# Patient Record
Sex: FEMALE | Race: BLACK OR AFRICAN AMERICAN | Employment: UNEMPLOYED | URBAN - METROPOLITAN AREA
[De-identification: names, ages, dates, MRNs, and addresses within clinical notes are randomized per-mention and may not be internally consistent; named-entity substitution may affect disease eponyms.]

---

## 2018-04-28 ENCOUNTER — HOSPITAL ENCOUNTER (EMERGENCY)
Age: 38
Discharge: HOME | End: 2018-04-28
Attending: EMERGENCY MEDICINE

## 2018-04-28 VITALS
SYSTOLIC BLOOD PRESSURE: 142 MMHG | WEIGHT: 173.06 LBS | TEMPERATURE: 98.9 F | DIASTOLIC BLOOD PRESSURE: 90 MMHG | BODY MASS INDEX: 27.81 KG/M2 | HEART RATE: 100 BPM | HEIGHT: 66 IN | OXYGEN SATURATION: 100 % | RESPIRATION RATE: 16 BRPM

## 2018-04-28 DIAGNOSIS — N89.8 VAGINAL DISCHARGE: Primary | ICD-10-CM

## 2018-04-28 DIAGNOSIS — E86.0 DEHYDRATION: ICD-10-CM

## 2018-04-28 LAB
-: ABNORMAL
AMORPHOUS: ABNORMAL
BACTERIA: ABNORMAL
BILIRUBIN URINE: NEGATIVE
CASTS UA: ABNORMAL /LPF
CHP ED QC CHECK: NORMAL
COLOR: YELLOW
COMMENT UA: ABNORMAL
CRYSTALS, UA: ABNORMAL /HPF
DIRECT EXAM: NORMAL
EPITHELIAL CELLS UA: ABNORMAL /HPF (ref 0–5)
GLUCOSE URINE: NEGATIVE
KETONES, URINE: NEGATIVE
LEUKOCYTE ESTERASE, URINE: NEGATIVE
Lab: NORMAL
MUCUS: ABNORMAL
NITRITE, URINE: NEGATIVE
OTHER OBSERVATIONS UA: ABNORMAL
PH UA: 6.5 (ref 5–8)
PREGNANCY TEST URINE, POC: NORMAL
PROTEIN UA: NEGATIVE
RBC UA: ABNORMAL /HPF (ref 0–2)
RENAL EPITHELIAL, UA: ABNORMAL /HPF
SPECIFIC GRAVITY UA: 1.02 (ref 1–1.03)
SPECIMEN DESCRIPTION: NORMAL
STATUS: NORMAL
TRICHOMONAS: ABNORMAL
TURBIDITY: ABNORMAL
URINE HGB: NEGATIVE
UROBILINOGEN, URINE: ABNORMAL
WBC UA: ABNORMAL /HPF (ref 0–5)
YEAST: ABNORMAL

## 2018-04-28 PROCEDURE — 87086 URINE CULTURE/COLONY COUNT: CPT

## 2018-04-28 PROCEDURE — 2580000003 HC RX 258: Performed by: NURSE PRACTITIONER

## 2018-04-28 PROCEDURE — 87660 TRICHOMONAS VAGIN DIR PROBE: CPT

## 2018-04-28 PROCEDURE — 84703 CHORIONIC GONADOTROPIN ASSAY: CPT

## 2018-04-28 PROCEDURE — 81001 URINALYSIS AUTO W/SCOPE: CPT

## 2018-04-28 PROCEDURE — 96360 HYDRATION IV INFUSION INIT: CPT

## 2018-04-28 PROCEDURE — 87480 CANDIDA DNA DIR PROBE: CPT

## 2018-04-28 PROCEDURE — 99283 EMERGENCY DEPT VISIT LOW MDM: CPT

## 2018-04-28 PROCEDURE — 87510 GARDNER VAG DNA DIR PROBE: CPT

## 2018-04-28 PROCEDURE — 87491 CHLMYD TRACH DNA AMP PROBE: CPT

## 2018-04-28 PROCEDURE — 87591 N.GONORRHOEAE DNA AMP PROB: CPT

## 2018-04-28 RX ORDER — DEXTROAMPHETAMINE SACCHARATE, AMPHETAMINE ASPARTATE, DEXTROAMPHETAMINE SULFATE AND AMPHETAMINE SULFATE 5; 5; 5; 5 MG/1; MG/1; MG/1; MG/1
20 TABLET ORAL DAILY
COMMUNITY

## 2018-04-28 RX ORDER — 0.9 % SODIUM CHLORIDE 0.9 %
1000 INTRAVENOUS SOLUTION INTRAVENOUS ONCE
Status: COMPLETED | OUTPATIENT
Start: 2018-04-28 | End: 2018-04-28

## 2018-04-28 RX ADMIN — SODIUM CHLORIDE 1000 ML: 9 INJECTION, SOLUTION INTRAVENOUS at 13:27

## 2018-04-29 LAB
CULTURE: NORMAL
CULTURE: NORMAL
Lab: NORMAL
SPECIMEN DESCRIPTION: NORMAL
SPECIMEN DESCRIPTION: NORMAL
STATUS: NORMAL

## 2018-04-30 LAB
C TRACH DNA GENITAL QL NAA+PROBE: NEGATIVE
HCG, PREGNANCY URINE (POC): NEGATIVE
N. GONORRHOEAE DNA: NEGATIVE

## 2020-03-02 ENCOUNTER — APPOINTMENT (OUTPATIENT)
Dept: GENERAL RADIOLOGY | Age: 40
End: 2020-03-02

## 2020-03-02 ENCOUNTER — HOSPITAL ENCOUNTER (EMERGENCY)
Age: 40
Discharge: HOME | End: 2020-03-02
Attending: EMERGENCY MEDICINE | Admitting: SURGERY

## 2020-03-02 VITALS
HEIGHT: 66 IN | DIASTOLIC BLOOD PRESSURE: 84 MMHG | TEMPERATURE: 98.7 F | OXYGEN SATURATION: 100 % | WEIGHT: 169.1 LBS | BODY MASS INDEX: 27.18 KG/M2 | RESPIRATION RATE: 16 BRPM | SYSTOLIC BLOOD PRESSURE: 142 MMHG | HEART RATE: 90 BPM

## 2020-03-02 LAB — D-DIMER QUANTITATIVE: 1.48 MG/L FEU

## 2020-03-02 PROCEDURE — 73090 X-RAY EXAM OF FOREARM: CPT

## 2020-03-02 PROCEDURE — 99283 EMERGENCY DEPT VISIT LOW MDM: CPT

## 2020-03-02 PROCEDURE — 85379 FIBRIN DEGRADATION QUANT: CPT

## 2020-03-02 ASSESSMENT — ENCOUNTER SYMPTOMS
CHEST TIGHTNESS: 0
VOMITING: 0
COLOR CHANGE: 1
NAUSEA: 0
SHORTNESS OF BREATH: 0

## 2020-03-02 ASSESSMENT — PAIN DESCRIPTION - LOCATION: LOCATION: ARM

## 2020-03-02 ASSESSMENT — PAIN DESCRIPTION - ORIENTATION: ORIENTATION: LEFT

## 2020-03-02 ASSESSMENT — PAIN SCALES - GENERAL: PAINLEVEL_OUTOF10: 6

## 2020-03-02 NOTE — ED PROVIDER NOTES
EMERGENCY DEPARTMENT ENCOUNTER   ATTENDING ATTESTATION     Pt Name: Elena Anderson  MRN: [id number]  [place] 1980  Date of evaluation: 3/2/20   Elena Anderson is a 46 y.o. female with CC: Arm Pain    MDM:   The patient is a 72-year-old female who presented to the emergency department secondary to left arm pain. In February 20 after she was seen in outside hospital IV was placed difficult IV access. She was concern of foreign body. X-rays obtained concern for 4.4 mm radiopaque density. I did place a bedside ultrasound which also was consistent likely foreign body. Vascular service paged. Discussed with Dr. Neal Officer, who will see the patient in the office. Given outpatient follow-up and parameters to return to the emergency department. CRITICAL CARE:       EKG: All EKG's are interpreted by the Emergency Department Physician who either signs or Co-signs this chart in the absence of a cardiologist.      RADIOLOGY:All plain film, CT, MRI, and formal ultrasound images (except ED bedside ultrasound) are read by the radiologist, see reports below, unless otherwise noted in MDM or here. XR RADIUS ULNA LEFT (2 VIEWS)   Final Result   No acute osseous abnormality. 4 mm radiodense foreign body 17 mm below the volar aspect of the skin mid   forearm. LABS: All lab results were reviewed by myself, and all abnormals are listed below. Labs Reviewed   D-DIMER, QUANTITATIVE     CONSULTS:  None  FINAL IMPRESSION      1. Retained foreign body of upper extremity            PASTMEDICAL HISTORY     Past Medical History:   Diagnosis Date    ADHD      SURGICAL HISTORY     History reviewed. No pertinent surgical history. CURRENT MEDICATIONS       Previous Medications    AMPHETAMINE-DEXTROAMPHETAMINE (ADDERALL) 20 MG TABLET    Take 20 mg by mouth daily. .     ALLERGIES     has No Known Allergies. FAMILY HISTORY     has no family status information on file.       SOCIAL HISTORY       Social History
Substance Use Topics    Alcohol use: No    Drug use: No     PHYSICAL EXAM     INITIAL VITALS: BP (!) 142/84   Pulse 90   Temp 98.7 °F (37.1 °C) (Oral)   Resp 16   Ht 5' 6\" (1.676 m)   Wt 169 lb 1.6 oz (76.7 kg)   LMP 02/17/2020   SpO2 100%   BMI 27.29 kg/m²    Physical Exam  Constitutional:       Appearance: Normal appearance. She is not ill-appearing. HENT:      Right Ear: External ear normal.      Left Ear: External ear normal.      Mouth/Throat:      Mouth: Mucous membranes are moist.      Pharynx: Oropharynx is clear. Eyes:      General:         Right eye: No discharge. Left eye: No discharge. Conjunctiva/sclera: Conjunctivae normal.   Cardiovascular:      Rate and Rhythm: Normal rate and regular rhythm. Pulses: Normal pulses. Pulmonary:      Effort: Pulmonary effort is normal.      Breath sounds: Normal breath sounds. Musculoskeletal: Normal range of motion. General: Tenderness present. No swelling or deformity. Arms:    Skin:     General: Skin is warm and dry. Findings: No bruising or erythema. Neurological:      General: No focal deficit present. Mental Status: She is alert and oriented to person, place, and time. Sensory: No sensory deficit. Psychiatric:         Mood and Affect: Mood normal.         Thought Content: Thought content normal.         DIAGNOSTIC RESULTS     RADIOLOGY:All plain film, CT, MRI, and formal ultrasound images (except ED bedside ultrasound) are read by the radiologist, see reports below, unless otherwise noted in MDM or here. Interpretation per the Radiologist below, if available at the time of this note:    XR RADIUS ULNA LEFT (2 VIEWS)   Final Result   No acute osseous abnormality. 4 mm radiodense foreign body 17 mm below the volar aspect of the skin mid   forearm.              LABS:  Labs Reviewed   D-DIMER, QUANTITATIVE       All other labs were within normal range or not returned as of this

## 2020-03-03 ENCOUNTER — HOSPITAL ENCOUNTER (OUTPATIENT)
Age: 40
Discharge: HOME | End: 2020-03-05

## 2020-03-03 PROCEDURE — 93971 EXTREMITY STUDY: CPT

## 2020-03-05 ENCOUNTER — ANESTHESIA EVENT (OUTPATIENT)
Dept: OPERATING ROOM | Age: 40
End: 2020-03-05

## 2020-03-06 ENCOUNTER — HOSPITAL ENCOUNTER (OUTPATIENT)
Age: 40
Setting detail: OUTPATIENT SURGERY
Discharge: HOME | End: 2020-03-06
Attending: SURGERY | Admitting: SURGERY

## 2020-03-06 ENCOUNTER — ANESTHESIA (OUTPATIENT)
Dept: OPERATING ROOM | Age: 40
End: 2020-03-06

## 2020-03-06 VITALS
HEIGHT: 66 IN | WEIGHT: 167 LBS | SYSTOLIC BLOOD PRESSURE: 134 MMHG | BODY MASS INDEX: 26.84 KG/M2 | RESPIRATION RATE: 17 BRPM | DIASTOLIC BLOOD PRESSURE: 81 MMHG | HEART RATE: 95 BPM | TEMPERATURE: 98.4 F | OXYGEN SATURATION: 100 %

## 2020-03-06 VITALS — OXYGEN SATURATION: 100 % | SYSTOLIC BLOOD PRESSURE: 124 MMHG | DIASTOLIC BLOOD PRESSURE: 65 MMHG

## 2020-03-06 LAB — HCG, PREGNANCY URINE (POC): NEGATIVE

## 2020-03-06 PROCEDURE — 6360000002 HC RX W HCPCS: Performed by: NURSE ANESTHETIST, CERTIFIED REGISTERED

## 2020-03-06 PROCEDURE — 81025 URINE PREGNANCY TEST: CPT

## 2020-03-06 PROCEDURE — 3700000000 HC ANESTHESIA ATTENDED CARE: Performed by: SURGERY

## 2020-03-06 PROCEDURE — 2500000003 HC RX 250 WO HCPCS: Performed by: SURGERY

## 2020-03-06 PROCEDURE — 3700000001 HC ADD 15 MINUTES (ANESTHESIA): Performed by: SURGERY

## 2020-03-06 PROCEDURE — 88311 DECALCIFY TISSUE: CPT

## 2020-03-06 PROCEDURE — 88305 TISSUE EXAM BY PATHOLOGIST: CPT

## 2020-03-06 PROCEDURE — 3600000002 HC SURGERY LEVEL 2 BASE: Performed by: SURGERY

## 2020-03-06 PROCEDURE — 2500000003 HC RX 250 WO HCPCS: Performed by: NURSE ANESTHETIST, CERTIFIED REGISTERED

## 2020-03-06 PROCEDURE — 7100000010 HC PHASE II RECOVERY - FIRST 15 MIN: Performed by: SURGERY

## 2020-03-06 PROCEDURE — 7100000011 HC PHASE II RECOVERY - ADDTL 15 MIN: Performed by: SURGERY

## 2020-03-06 PROCEDURE — 2500000003 HC RX 250 WO HCPCS

## 2020-03-06 PROCEDURE — 2580000003 HC RX 258: Performed by: ANESTHESIOLOGY

## 2020-03-06 PROCEDURE — 2709999900 HC NON-CHARGEABLE SUPPLY: Performed by: SURGERY

## 2020-03-06 PROCEDURE — 3600000012 HC SURGERY LEVEL 2 ADDTL 15MIN: Performed by: SURGERY

## 2020-03-06 RX ORDER — PROPOFOL 10 MG/ML
INJECTION, EMULSION INTRAVENOUS PRN
Status: DISCONTINUED | OUTPATIENT
Start: 2020-03-06 | End: 2020-03-06 | Stop reason: SDUPTHER

## 2020-03-06 RX ORDER — PROPOFOL 10 MG/ML
INJECTION, EMULSION INTRAVENOUS CONTINUOUS PRN
Status: DISCONTINUED | OUTPATIENT
Start: 2020-03-06 | End: 2020-03-06 | Stop reason: SDUPTHER

## 2020-03-06 RX ORDER — LIDOCAINE HYDROCHLORIDE 10 MG/ML
INJECTION, SOLUTION EPIDURAL; INFILTRATION; INTRACAUDAL; PERINEURAL PRN
Status: DISCONTINUED | OUTPATIENT
Start: 2020-03-06 | End: 2020-03-06 | Stop reason: ALTCHOICE

## 2020-03-06 RX ORDER — HYDROMORPHONE HCL 110MG/55ML
0.5 PATIENT CONTROLLED ANALGESIA SYRINGE INTRAVENOUS EVERY 5 MIN PRN
Status: DISCONTINUED | OUTPATIENT
Start: 2020-03-06 | End: 2020-03-06 | Stop reason: HOSPADM

## 2020-03-06 RX ORDER — LIDOCAINE HYDROCHLORIDE 10 MG/ML
1 INJECTION, SOLUTION EPIDURAL; INFILTRATION; INTRACAUDAL; PERINEURAL
Status: DISCONTINUED | OUTPATIENT
Start: 2020-03-06 | End: 2020-03-06 | Stop reason: HOSPADM

## 2020-03-06 RX ORDER — LABETALOL HYDROCHLORIDE 5 MG/ML
5 INJECTION, SOLUTION INTRAVENOUS EVERY 10 MIN PRN
Status: DISCONTINUED | OUTPATIENT
Start: 2020-03-06 | End: 2020-03-06 | Stop reason: HOSPADM

## 2020-03-06 RX ORDER — OXYCODONE HYDROCHLORIDE AND ACETAMINOPHEN 5; 325 MG/1; MG/1
2 TABLET ORAL PRN
Status: DISCONTINUED | OUTPATIENT
Start: 2020-03-06 | End: 2020-03-06 | Stop reason: HOSPADM

## 2020-03-06 RX ORDER — PROMETHAZINE HYDROCHLORIDE 25 MG/ML
6.25 INJECTION, SOLUTION INTRAMUSCULAR; INTRAVENOUS
Status: DISCONTINUED | OUTPATIENT
Start: 2020-03-06 | End: 2020-03-06 | Stop reason: HOSPADM

## 2020-03-06 RX ORDER — SODIUM CHLORIDE, SODIUM LACTATE, POTASSIUM CHLORIDE, CALCIUM CHLORIDE 600; 310; 30; 20 MG/100ML; MG/100ML; MG/100ML; MG/100ML
INJECTION, SOLUTION INTRAVENOUS CONTINUOUS
Status: DISCONTINUED | OUTPATIENT
Start: 2020-03-06 | End: 2020-03-06 | Stop reason: HOSPADM

## 2020-03-06 RX ORDER — SODIUM CHLORIDE 0.9 % (FLUSH) 0.9 %
10 SYRINGE (ML) INJECTION PRN
Status: DISCONTINUED | OUTPATIENT
Start: 2020-03-06 | End: 2020-03-06 | Stop reason: HOSPADM

## 2020-03-06 RX ORDER — FENTANYL CITRATE 50 UG/ML
25 INJECTION, SOLUTION INTRAMUSCULAR; INTRAVENOUS EVERY 5 MIN PRN
Status: DISCONTINUED | OUTPATIENT
Start: 2020-03-06 | End: 2020-03-06 | Stop reason: HOSPADM

## 2020-03-06 RX ORDER — MIDAZOLAM HYDROCHLORIDE 1 MG/ML
INJECTION INTRAMUSCULAR; INTRAVENOUS PRN
Status: DISCONTINUED | OUTPATIENT
Start: 2020-03-06 | End: 2020-03-06 | Stop reason: SDUPTHER

## 2020-03-06 RX ORDER — HYDRALAZINE HYDROCHLORIDE 20 MG/ML
5 INJECTION INTRAMUSCULAR; INTRAVENOUS EVERY 10 MIN PRN
Status: DISCONTINUED | OUTPATIENT
Start: 2020-03-06 | End: 2020-03-06 | Stop reason: HOSPADM

## 2020-03-06 RX ORDER — LIDOCAINE HYDROCHLORIDE 20 MG/ML
INJECTION, SOLUTION EPIDURAL; INFILTRATION; INTRACAUDAL; PERINEURAL PRN
Status: DISCONTINUED | OUTPATIENT
Start: 2020-03-06 | End: 2020-03-06 | Stop reason: SDUPTHER

## 2020-03-06 RX ORDER — SODIUM CHLORIDE 9 MG/ML
INJECTION, SOLUTION INTRAVENOUS CONTINUOUS
Status: DISCONTINUED | OUTPATIENT
Start: 2020-03-06 | End: 2020-03-06

## 2020-03-06 RX ORDER — OXYCODONE HYDROCHLORIDE AND ACETAMINOPHEN 5; 325 MG/1; MG/1
1 TABLET ORAL PRN
Status: DISCONTINUED | OUTPATIENT
Start: 2020-03-06 | End: 2020-03-06 | Stop reason: HOSPADM

## 2020-03-06 RX ORDER — ONDANSETRON 2 MG/ML
4 INJECTION INTRAMUSCULAR; INTRAVENOUS
Status: DISCONTINUED | OUTPATIENT
Start: 2020-03-06 | End: 2020-03-06 | Stop reason: HOSPADM

## 2020-03-06 RX ORDER — SODIUM CHLORIDE 0.9 % (FLUSH) 0.9 %
10 SYRINGE (ML) INJECTION EVERY 12 HOURS SCHEDULED
Status: DISCONTINUED | OUTPATIENT
Start: 2020-03-06 | End: 2020-03-06 | Stop reason: HOSPADM

## 2020-03-06 RX ADMIN — SODIUM CHLORIDE, POTASSIUM CHLORIDE, SODIUM LACTATE AND CALCIUM CHLORIDE: 600; 310; 30; 20 INJECTION, SOLUTION INTRAVENOUS at 11:02

## 2020-03-06 RX ADMIN — PROPOFOL 50 MG: 10 INJECTION, EMULSION INTRAVENOUS at 11:49

## 2020-03-06 RX ADMIN — LIDOCAINE HYDROCHLORIDE 80 MG: 20 INJECTION, SOLUTION EPIDURAL; INFILTRATION; INTRACAUDAL; PERINEURAL at 11:46

## 2020-03-06 RX ADMIN — PROPOFOL 50 MG: 10 INJECTION, EMULSION INTRAVENOUS at 11:46

## 2020-03-06 RX ADMIN — PROPOFOL 50 MG: 10 INJECTION, EMULSION INTRAVENOUS at 11:57

## 2020-03-06 RX ADMIN — MIDAZOLAM 2 MG: 1 INJECTION INTRAMUSCULAR; INTRAVENOUS at 11:38

## 2020-03-06 RX ADMIN — PROPOFOL 150 MCG/KG/MIN: 10 INJECTION, EMULSION INTRAVENOUS at 11:46

## 2020-03-06 RX ADMIN — PROPOFOL 30 MG: 10 INJECTION, EMULSION INTRAVENOUS at 12:18

## 2020-03-06 ASSESSMENT — PULMONARY FUNCTION TESTS
PIF_VALUE: 1
PIF_VALUE: 1
PIF_VALUE: 2
PIF_VALUE: 1
PIF_VALUE: 0
PIF_VALUE: 1
PIF_VALUE: 0
PIF_VALUE: 1
PIF_VALUE: 0
PIF_VALUE: 1

## 2020-03-06 ASSESSMENT — PAIN - FUNCTIONAL ASSESSMENT: PAIN_FUNCTIONAL_ASSESSMENT: 0-10
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
## 2020-03-06 NOTE — ANESTHESIA PRE PROCEDURE
Department of Anesthesiology  Preprocedure Note       Name:  Dina Vale   Age:  46 y.o.  :  1980                                          MRN:  [id number]         Date:  3/6/2020      Surgeon: Mejia Lassiter):  Lali Abad MD    Procedure: LEFT   ARM FOREIGN BODY REMOVAL  (IV CATHETER) (Left )    Medications prior to admission:   Prior to Admission medications    Medication Sig Start Date End Date Taking? Authorizing Provider   amphetamine-dextroamphetamine (ADDERALL) 20 MG tablet Take 20 mg by mouth daily. Gilbert George Historical Provider, MD       Current medications:    Current Facility-Administered Medications   Medication Dose Route Frequency Provider Last Rate Last Dose    lactated ringers infusion   Intravenous Continuous Kareen Shirley  mL/hr at 20 1102      sodium chloride flush 0.9 % injection 10 mL  10 mL Intravenous 2 times per day Kareen Shirley MD        sodium chloride flush 0.9 % injection 10 mL  10 mL Intravenous PRN Emy Oconnor MD        lidocaine PF 1 % injection 1 mL  1 mL Intradermal Once PRN Kareen Shirley MD        lidocaine PF 1 % injection    PRN Lali Abad MD   10 mL at 20 1213     Facility-Administered Medications Ordered in Other Encounters   Medication Dose Route Frequency Provider Last Rate Last Dose    propofol injection    PRN Mayda Rendon, APRN - CRNA   30 mg at 20 1218    propofol injection    Continuous PRN Mayda Rendon, APRN - CRNA 68.2 mL/hr at 20 1214 150 mcg/kg/min at 20 1214    lidocaine PF 2 % injection    PRN Mayda Rendon, APRN - CRNA   80 mg at 20 1146    midazolam (VERSED) injection    PRN Mayda Rendon, APRN - CRNA   2 mg at 20 1138       Allergies:  No Known Allergies    Problem List:  There is no problem list on file for this patient.       Past Medical History:        Diagnosis Date    ADHD     History of blood transfusion 2020       Past Surgical History: Procedure Laterality Date    ABDOMINAL ADHESION SURGERY  2013    removal of adhesions from bowel    GASTRIC BYPASS SURGERY  2013    OTHER SURGICAL HISTORY  2013    bowel obstruction surgery       Social History:    Social History     Tobacco Use    Smoking status: Current Every Day Smoker     Packs/day: 0.50   Substance Use Topics    Alcohol use: Yes     Alcohol/week: 8.0 standard drinks     Types: 8 Glasses of wine per week                                Ready to quit: Not Answered  Counseling given: Not Answered      Vital Signs (Current):   Vitals:    03/06/20 1025 03/06/20 1031   BP: 137/86    Pulse: 107    Resp: 18    Temp: 98.8 °F (37.1 °C)    TempSrc: Oral    SpO2: 100%    Weight:  167 lb (75.8 kg)   Height:  5' 6\" (1.676 m)                                              BP Readings from Last 3 Encounters:   03/06/20 137/86   03/02/20 (!) 142/84   04/28/18 (!) 142/90       NPO Status: Time of last liquid consumption: 1930                        Time of last solid consumption: 1930                        Date of last liquid consumption: 03/05/20                        Date of last solid food consumption: 03/05/20    BMI:   Wt Readings from Last 3 Encounters:   03/06/20 167 lb (75.8 kg)   03/02/20 169 lb 1.6 oz (76.7 kg)   04/28/18 173 lb 1 oz (78.5 kg)     Body mass index is 26.95 kg/m². CBC: No results found for: WBC, RBC, HGB, HCT, MCV, RDW, PLT    CMP: No results found for: NA, K, CL, CO2, BUN, CREATININE, GFRAA, AGRATIO, LABGLOM, GLUCOSE, PROT, CALCIUM, BILITOT, ALKPHOS, AST, ALT    POC Tests: No results for input(s): POCGLU, POCNA, POCK, POCCL, POCBUN, POCHEMO, POCHCT in the last 72 hours.     Coags: No results found for: PROTIME, INR, APTT    HCG (If Applicable):   Lab Results   Component Value Date    PREGTESTUR neg 04/28/2018    HCG NEGATIVE 03/06/2020        ABGs: No results found for: PHART, PO2ART, UPO1LAI, CQA5RPY, BEART, E3FOHTWF     Type & Screen (If Applicable):  No results found for:

## 2020-03-09 LAB — SURGICAL PATHOLOGY REPORT: NORMAL

## 2021-07-08 RX ORDER — SODIUM CHLORIDE, SODIUM LACTATE, POTASSIUM CHLORIDE, CALCIUM CHLORIDE 600; 310; 30; 20 MG/100ML; MG/100ML; MG/100ML; MG/100ML
1000 INJECTION, SOLUTION INTRAVENOUS CONTINUOUS
Status: CANCELLED | OUTPATIENT
Start: 2021-07-08

## 2021-07-13 ENCOUNTER — HOSPITAL ENCOUNTER (OUTPATIENT)
Dept: PREADMISSION TESTING | Age: 41
Setting detail: OUTPATIENT SURGERY
Discharge: HOME | End: 2021-07-17
Payer: MEDICAID

## 2021-07-13 VITALS
WEIGHT: 155 LBS | SYSTOLIC BLOOD PRESSURE: 109 MMHG | TEMPERATURE: 97.2 F | OXYGEN SATURATION: 100 % | RESPIRATION RATE: 20 BRPM | HEART RATE: 104 BPM | BODY MASS INDEX: 24.91 KG/M2 | HEIGHT: 66 IN | DIASTOLIC BLOOD PRESSURE: 72 MMHG

## 2021-07-13 LAB
HCT VFR BLD CALC: 28.7 % (ref 36.3–47.1)
HEMOGLOBIN: 8 G/DL (ref 11.9–15.1)
MCH RBC QN AUTO: 24.2 PG (ref 25.2–33.5)
MCHC RBC AUTO-ENTMCNC: 27.9 G/DL (ref 28.4–34.8)
MCV RBC AUTO: 86.7 FL (ref 82.6–102.9)
NRBC AUTOMATED: 0 PER 100 WBC
PDW BLD-RTO: 18.7 % (ref 11.8–14.4)
PLATELET # BLD: 566 K/UL (ref 138–453)
PMV BLD AUTO: 9.2 FL (ref 8.1–13.5)
RBC # BLD: 3.31 M/UL (ref 3.95–5.11)
WBC # BLD: 5.3 K/UL (ref 3.5–11.3)

## 2021-07-13 PROCEDURE — 85027 COMPLETE CBC AUTOMATED: CPT

## 2021-07-13 PROCEDURE — 93005 ELECTROCARDIOGRAM TRACING: CPT | Performed by: ANESTHESIOLOGY

## 2021-07-13 PROCEDURE — 36415 COLL VENOUS BLD VENIPUNCTURE: CPT

## 2021-07-13 RX ORDER — FOLIC ACID 1 MG/1
TABLET ORAL
COMMUNITY

## 2021-07-13 RX ORDER — MEDROXYPROGESTERONE ACETATE 10 MG/1
TABLET ORAL
COMMUNITY

## 2021-07-13 RX ORDER — CYANOCOBALAMIN 1000 UG/ML
INJECTION INTRAMUSCULAR; SUBCUTANEOUS
COMMUNITY
Start: 2021-06-25

## 2021-07-13 RX ORDER — ASPIRIN 81 MG/1
TABLET, CHEWABLE ORAL
COMMUNITY

## 2021-07-13 RX ORDER — TRANEXAMIC ACID 650 1/1
TABLET ORAL
COMMUNITY

## 2021-07-14 LAB
EKG ATRIAL RATE: 91 BPM
EKG P AXIS: 45 DEGREES
EKG P-R INTERVAL: 136 MS
EKG Q-T INTERVAL: 356 MS
EKG QRS DURATION: 82 MS
EKG QTC CALCULATION (BAZETT): 437 MS
EKG R AXIS: 56 DEGREES
EKG T AXIS: 53 DEGREES
EKG VENTRICULAR RATE: 91 BPM

## 2021-07-14 PROCEDURE — 93010 ELECTROCARDIOGRAM REPORT: CPT | Performed by: INTERNAL MEDICINE

## 2021-07-14 NOTE — PROGRESS NOTES
Spoke with Dr. Ino Monroe about HGB at a 8.0 and she isn't concerned unless it is 7.0 or below and is hoping surgery on 7/16/2021 will help.

## 2021-07-15 NOTE — H&P
OB/GYN Pre-Op H&P  [facility]    Patient Name: Evita Fraser     Patient : 1980  [facility]  Admission Date/Time: 2021  8:08 AM  Primary Care Physician: No primary care provider on file. MRN: [id number]    Date: 2021  Time: 9:26 AM    The patient was seen in pre-op holding. She is here for hysteroscopy, dilation and curettage. She was seen in the office for complaints of menorrhagia and was noted to have a uterine fibroid. She states she has been having heavy menstrual bleeding for approximately two years. Her most recent Hgb was 8.0 on 21. Last pap was 2018 and negative for cytology and HPV. The procedure risks and complications were reviewed. The labs, Consent, and H&P were reviewed and updated. The patient was counseled on the possibility of  the need of a second surgery. The patient voiced understanding and had all of her questions answered. The possibility of incomplete removal of abnormal tissue was discussed. OBSTETRICAL HISTORY:   OB History   No obstetric history on file. PAST MEDICAL HISTORY:   has a past medical history of ADHD, Anemia, History of blood transfusion, and Wellness examination. PAST SURGICAL HISTORY:   has a past surgical history that includes Gastric bypass surgery (); other surgical history (); Abdominal adhesion surgery (); and Arm Surgery (Left, 3/6/2020). ALLERGIES:  Allergies as of 2021    (No Known Allergies)       MEDICATIONS:  Current Facility-Administered Medications   Medication Dose Route Frequency Provider Last Rate Last Admin    lactated ringers infusion 1,000 mL  1,000 mL Intravenous Continuous Yvonne Alexander MD 50 mL/hr at 21 0911 1,000 mL at 21 0911    midazolam PF (VERSED) injection 1 mg  1 mg Intravenous Q5 Min PRN Steve Richards MD   1 mg at 21 0911       FAMILY HISTORY:  family history is not on file.     SOCIAL HISTORY:   reports that she 07/13/2021 28.7* 36.3 - 47.1 % Final    MCV 07/13/2021 86.7  82.6 - 102.9 fL Final    MCH 07/13/2021 24.2* 25.2 - 33.5 pg Final    MCHC 07/13/2021 27.9* 28.4 - 34.8 g/dL Final    RDW 07/13/2021 18.7* 11.8 - 14.4 % Final    Platelets 62/83/6076 566* 138 - 453 k/uL Final    MPV 07/13/2021 9.2  8.1 - 13.5 fL Final    NRBC Automated 07/13/2021 0.0  0.0 per 100 WBC Final       DIAGNOSIS & PLAN:  1. Menorrhagia    - Proceed with planned procedure: Hysteroscopy, Dilation and Curettage   - Consent signed, on chart. - UPT negative    - The patient is ready for transport to the operative suite. Counseling: The patient was counseled on all options both medical and surgical, conservative as well as definitive. She has elected to proceed with the procedure as stated above. The patient was counseled on the procedure. Risks and complications were reviewed in detail. The patients orders, labs, consents have been completed. The history and physical as well as all supporting surgical documentation will be forwarded to the pre-operative holding area. The patient is aware that this procedure may not alleviate her symptoms. That there may be a necessity for a second surgery and that there may be an incomplete removal of abnormal tissue.     Sari Dupree DO  Ob/Gyn Resident  Pager: [phone]  [facility]  7/16/2021, 9:26 AM

## 2021-07-16 ENCOUNTER — HOSPITAL ENCOUNTER (OUTPATIENT)
Age: 41
Setting detail: OUTPATIENT SURGERY
Discharge: HOME | End: 2021-07-16
Attending: OBSTETRICS & GYNECOLOGY | Admitting: OBSTETRICS & GYNECOLOGY
Payer: MEDICAID

## 2021-07-16 ENCOUNTER — ANESTHESIA EVENT (OUTPATIENT)
Dept: OPERATING ROOM | Age: 41
End: 2021-07-16
Payer: MEDICAID

## 2021-07-16 ENCOUNTER — ANESTHESIA (OUTPATIENT)
Dept: OPERATING ROOM | Age: 41
End: 2021-07-16
Payer: MEDICAID

## 2021-07-16 VITALS — TEMPERATURE: 96.4 F | DIASTOLIC BLOOD PRESSURE: 97 MMHG | OXYGEN SATURATION: 96 % | SYSTOLIC BLOOD PRESSURE: 149 MMHG

## 2021-07-16 VITALS
DIASTOLIC BLOOD PRESSURE: 71 MMHG | TEMPERATURE: 97.2 F | RESPIRATION RATE: 18 BRPM | WEIGHT: 155 LBS | HEART RATE: 85 BPM | HEIGHT: 66 IN | BODY MASS INDEX: 24.91 KG/M2 | OXYGEN SATURATION: 100 % | SYSTOLIC BLOOD PRESSURE: 124 MMHG

## 2021-07-16 DIAGNOSIS — Z98.890 STATUS POST DILATION AND CURETTAGE: Primary | ICD-10-CM

## 2021-07-16 PROBLEM — N92.0 MENORRHAGIA: Status: ACTIVE | Noted: 2021-07-16

## 2021-07-16 LAB — HCG, PREGNANCY URINE (POC): NEGATIVE

## 2021-07-16 PROCEDURE — 7100000001 HC PACU RECOVERY - ADDTL 15 MIN: Performed by: OBSTETRICS & GYNECOLOGY

## 2021-07-16 PROCEDURE — 3600000003 HC SURGERY LEVEL 3 BASE: Performed by: OBSTETRICS & GYNECOLOGY

## 2021-07-16 PROCEDURE — 6360000002 HC RX W HCPCS: Performed by: ANESTHESIOLOGY

## 2021-07-16 PROCEDURE — 6360000002 HC RX W HCPCS: Performed by: NURSE ANESTHETIST, CERTIFIED REGISTERED

## 2021-07-16 PROCEDURE — 6370000000 HC RX 637 (ALT 250 FOR IP): Performed by: ANESTHESIOLOGY

## 2021-07-16 PROCEDURE — 2720000010 HC SURG SUPPLY STERILE: Performed by: OBSTETRICS & GYNECOLOGY

## 2021-07-16 PROCEDURE — 6360000002 HC RX W HCPCS

## 2021-07-16 PROCEDURE — 2500000003 HC RX 250 WO HCPCS: Performed by: NURSE ANESTHETIST, CERTIFIED REGISTERED

## 2021-07-16 PROCEDURE — 2580000003 HC RX 258: Performed by: ANESTHESIOLOGY

## 2021-07-16 PROCEDURE — 2580000003 HC RX 258: Performed by: OBSTETRICS & GYNECOLOGY

## 2021-07-16 PROCEDURE — 7100000011 HC PHASE II RECOVERY - ADDTL 15 MIN: Performed by: OBSTETRICS & GYNECOLOGY

## 2021-07-16 PROCEDURE — 88305 TISSUE EXAM BY PATHOLOGIST: CPT

## 2021-07-16 PROCEDURE — 2709999900 HC NON-CHARGEABLE SUPPLY: Performed by: OBSTETRICS & GYNECOLOGY

## 2021-07-16 PROCEDURE — 81025 URINE PREGNANCY TEST: CPT

## 2021-07-16 PROCEDURE — 7100000010 HC PHASE II RECOVERY - FIRST 15 MIN: Performed by: OBSTETRICS & GYNECOLOGY

## 2021-07-16 PROCEDURE — 3600000013 HC SURGERY LEVEL 3 ADDTL 15MIN: Performed by: OBSTETRICS & GYNECOLOGY

## 2021-07-16 PROCEDURE — 7100000000 HC PACU RECOVERY - FIRST 15 MIN: Performed by: OBSTETRICS & GYNECOLOGY

## 2021-07-16 PROCEDURE — 3700000001 HC ADD 15 MINUTES (ANESTHESIA): Performed by: OBSTETRICS & GYNECOLOGY

## 2021-07-16 PROCEDURE — 3700000000 HC ANESTHESIA ATTENDED CARE: Performed by: OBSTETRICS & GYNECOLOGY

## 2021-07-16 PROCEDURE — 2580000003 HC RX 258: Performed by: NURSE ANESTHETIST, CERTIFIED REGISTERED

## 2021-07-16 RX ORDER — ONDANSETRON 2 MG/ML
INJECTION INTRAMUSCULAR; INTRAVENOUS PRN
Status: DISCONTINUED | OUTPATIENT
Start: 2021-07-16 | End: 2021-07-16 | Stop reason: SDUPTHER

## 2021-07-16 RX ORDER — MIDAZOLAM HYDROCHLORIDE 1 MG/ML
INJECTION INTRAMUSCULAR; INTRAVENOUS
Status: COMPLETED
Start: 2021-07-16 | End: 2021-07-16

## 2021-07-16 RX ORDER — MORPHINE SULFATE 2 MG/ML
2 INJECTION, SOLUTION INTRAMUSCULAR; INTRAVENOUS EVERY 5 MIN PRN
Status: DISCONTINUED | OUTPATIENT
Start: 2021-07-16 | End: 2021-07-16 | Stop reason: HOSPADM

## 2021-07-16 RX ORDER — LABETALOL HYDROCHLORIDE 5 MG/ML
5 INJECTION, SOLUTION INTRAVENOUS EVERY 10 MIN PRN
Status: DISCONTINUED | OUTPATIENT
Start: 2021-07-16 | End: 2021-07-16 | Stop reason: HOSPADM

## 2021-07-16 RX ORDER — PROPOFOL 10 MG/ML
INJECTION, EMULSION INTRAVENOUS PRN
Status: DISCONTINUED | OUTPATIENT
Start: 2021-07-16 | End: 2021-07-16 | Stop reason: SDUPTHER

## 2021-07-16 RX ORDER — SODIUM CHLORIDE, SODIUM LACTATE, POTASSIUM CHLORIDE, CALCIUM CHLORIDE 600; 310; 30; 20 MG/100ML; MG/100ML; MG/100ML; MG/100ML
1000 INJECTION, SOLUTION INTRAVENOUS CONTINUOUS
Status: DISCONTINUED | OUTPATIENT
Start: 2021-07-16 | End: 2021-07-16 | Stop reason: HOSPADM

## 2021-07-16 RX ORDER — MIDAZOLAM HYDROCHLORIDE 2 MG/2ML
1 INJECTION, SOLUTION INTRAMUSCULAR; INTRAVENOUS EVERY 5 MIN PRN
Status: DISCONTINUED | OUTPATIENT
Start: 2021-07-16 | End: 2021-07-16 | Stop reason: HOSPADM

## 2021-07-16 RX ORDER — DEXAMETHASONE SODIUM PHOSPHATE 10 MG/ML
INJECTION INTRAMUSCULAR; INTRAVENOUS PRN
Status: DISCONTINUED | OUTPATIENT
Start: 2021-07-16 | End: 2021-07-16 | Stop reason: SDUPTHER

## 2021-07-16 RX ORDER — OXYCODONE HYDROCHLORIDE AND ACETAMINOPHEN 5; 325 MG/1; MG/1
1 TABLET ORAL PRN
Status: COMPLETED | OUTPATIENT
Start: 2021-07-16 | End: 2021-07-16

## 2021-07-16 RX ORDER — DIPHENHYDRAMINE HYDROCHLORIDE 50 MG/ML
12.5 INJECTION INTRAMUSCULAR; INTRAVENOUS
Status: DISCONTINUED | OUTPATIENT
Start: 2021-07-16 | End: 2021-07-16 | Stop reason: HOSPADM

## 2021-07-16 RX ORDER — FENTANYL CITRATE 50 UG/ML
25 INJECTION, SOLUTION INTRAMUSCULAR; INTRAVENOUS EVERY 5 MIN PRN
Status: DISCONTINUED | OUTPATIENT
Start: 2021-07-16 | End: 2021-07-16 | Stop reason: HOSPADM

## 2021-07-16 RX ORDER — LIDOCAINE HYDROCHLORIDE 10 MG/ML
INJECTION, SOLUTION EPIDURAL; INFILTRATION; INTRACAUDAL; PERINEURAL PRN
Status: DISCONTINUED | OUTPATIENT
Start: 2021-07-16 | End: 2021-07-16 | Stop reason: SDUPTHER

## 2021-07-16 RX ORDER — CEFAZOLIN SODIUM 2 G/50ML
SOLUTION INTRAVENOUS PRN
Status: DISCONTINUED | OUTPATIENT
Start: 2021-07-16 | End: 2021-07-16 | Stop reason: SDUPTHER

## 2021-07-16 RX ORDER — ONDANSETRON 2 MG/ML
4 INJECTION INTRAMUSCULAR; INTRAVENOUS
Status: DISCONTINUED | OUTPATIENT
Start: 2021-07-16 | End: 2021-07-16 | Stop reason: HOSPADM

## 2021-07-16 RX ORDER — KETOROLAC TROMETHAMINE 30 MG/ML
INJECTION, SOLUTION INTRAMUSCULAR; INTRAVENOUS PRN
Status: DISCONTINUED | OUTPATIENT
Start: 2021-07-16 | End: 2021-07-16 | Stop reason: SDUPTHER

## 2021-07-16 RX ORDER — FENTANYL CITRATE 50 UG/ML
INJECTION, SOLUTION INTRAMUSCULAR; INTRAVENOUS PRN
Status: DISCONTINUED | OUTPATIENT
Start: 2021-07-16 | End: 2021-07-16 | Stop reason: SDUPTHER

## 2021-07-16 RX ORDER — ACETAMINOPHEN AND CODEINE PHOSPHATE 300; 30 MG/1; MG/1
1 TABLET ORAL EVERY 4 HOURS PRN
Qty: 12 TABLET | Refills: 0 | Status: SHIPPED | OUTPATIENT
Start: 2021-07-16 | End: 2021-07-19

## 2021-07-16 RX ORDER — SODIUM CHLORIDE, SODIUM LACTATE, POTASSIUM CHLORIDE, CALCIUM CHLORIDE 600; 310; 30; 20 MG/100ML; MG/100ML; MG/100ML; MG/100ML
INJECTION, SOLUTION INTRAVENOUS CONTINUOUS PRN
Status: DISCONTINUED | OUTPATIENT
Start: 2021-07-16 | End: 2021-07-16 | Stop reason: SDUPTHER

## 2021-07-16 RX ORDER — OXYCODONE HYDROCHLORIDE AND ACETAMINOPHEN 5; 325 MG/1; MG/1
2 TABLET ORAL PRN
Status: COMPLETED | OUTPATIENT
Start: 2021-07-16 | End: 2021-07-16

## 2021-07-16 RX ORDER — MAGNESIUM HYDROXIDE 1200 MG/15ML
LIQUID ORAL CONTINUOUS PRN
Status: COMPLETED | OUTPATIENT
Start: 2021-07-16 | End: 2021-07-16

## 2021-07-16 RX ADMIN — SODIUM CHLORIDE, POTASSIUM CHLORIDE, SODIUM LACTATE AND CALCIUM CHLORIDE: 600; 310; 30; 20 INJECTION, SOLUTION INTRAVENOUS at 10:11

## 2021-07-16 RX ADMIN — ONDANSETRON 4 MG: 2 INJECTION, SOLUTION INTRAMUSCULAR; INTRAVENOUS at 10:30

## 2021-07-16 RX ADMIN — CEFAZOLIN SODIUM 2000 MG: 2 SOLUTION INTRAVENOUS at 10:25

## 2021-07-16 RX ADMIN — OXYCODONE HYDROCHLORIDE AND ACETAMINOPHEN 1 TABLET: 5; 325 TABLET ORAL at 12:59

## 2021-07-16 RX ADMIN — MORPHINE SULFATE 2 MG: 2 INJECTION, SOLUTION INTRAMUSCULAR; INTRAVENOUS at 12:47

## 2021-07-16 RX ADMIN — MORPHINE SULFATE 2 MG: 2 INJECTION, SOLUTION INTRAMUSCULAR; INTRAVENOUS at 12:21

## 2021-07-16 RX ADMIN — MIDAZOLAM HYDROCHLORIDE 1 MG: 2 INJECTION, SOLUTION INTRAMUSCULAR; INTRAVENOUS at 09:11

## 2021-07-16 RX ADMIN — FENTANYL CITRATE 50 MCG: 50 INJECTION, SOLUTION INTRAMUSCULAR; INTRAVENOUS at 10:30

## 2021-07-16 RX ADMIN — PROPOFOL INJECTABLE EMULSION 200 MG: 10 INJECTION, EMULSION INTRAVENOUS at 10:14

## 2021-07-16 RX ADMIN — MIDAZOLAM HYDROCHLORIDE 1 MG: 1 INJECTION, SOLUTION INTRAMUSCULAR; INTRAVENOUS at 09:11

## 2021-07-16 RX ADMIN — SODIUM CHLORIDE, POTASSIUM CHLORIDE, SODIUM LACTATE AND CALCIUM CHLORIDE 1000 ML: 600; 310; 30; 20 INJECTION, SOLUTION INTRAVENOUS at 09:11

## 2021-07-16 RX ADMIN — KETOROLAC TROMETHAMINE 30 MG: 30 INJECTION, SOLUTION INTRAMUSCULAR at 12:00

## 2021-07-16 RX ADMIN — LIDOCAINE HYDROCHLORIDE 50 MG: 10 INJECTION, SOLUTION EPIDURAL; INFILTRATION; INTRACAUDAL; PERINEURAL at 10:14

## 2021-07-16 RX ADMIN — FENTANYL CITRATE 50 MCG: 50 INJECTION, SOLUTION INTRAMUSCULAR; INTRAVENOUS at 10:14

## 2021-07-16 RX ADMIN — DEXAMETHASONE SODIUM PHOSPHATE 4 MG: 10 INJECTION INTRAMUSCULAR; INTRAVENOUS at 10:26

## 2021-07-16 ASSESSMENT — PAIN DESCRIPTION - PAIN TYPE
TYPE: ACUTE PAIN;SURGICAL PAIN

## 2021-07-16 ASSESSMENT — PULMONARY FUNCTION TESTS
PIF_VALUE: 12
PIF_VALUE: 12
PIF_VALUE: 13
PIF_VALUE: 12
PIF_VALUE: 12
PIF_VALUE: 13
PIF_VALUE: 12
PIF_VALUE: 13
PIF_VALUE: 12
PIF_VALUE: 13
PIF_VALUE: 12
PIF_VALUE: 13
PIF_VALUE: 12
PIF_VALUE: 13
PIF_VALUE: 12
PIF_VALUE: 12
PIF_VALUE: 1
PIF_VALUE: 12
PIF_VALUE: 12
PIF_VALUE: 13
PIF_VALUE: 18
PIF_VALUE: 13
PIF_VALUE: 12
PIF_VALUE: 3
PIF_VALUE: 5
PIF_VALUE: 13
PIF_VALUE: 12
PIF_VALUE: 5
PIF_VALUE: 12
PIF_VALUE: 13
PIF_VALUE: 12
PIF_VALUE: 2
PIF_VALUE: 0
PIF_VALUE: 12
PIF_VALUE: 13
PIF_VALUE: 12
PIF_VALUE: 11
PIF_VALUE: 12
PIF_VALUE: 8
PIF_VALUE: 12
PIF_VALUE: 13
PIF_VALUE: 12
PIF_VALUE: 20
PIF_VALUE: 12
PIF_VALUE: 13
PIF_VALUE: 13
PIF_VALUE: 12
PIF_VALUE: 13
PIF_VALUE: 12
PIF_VALUE: 12
PIF_VALUE: 3
PIF_VALUE: 13
PIF_VALUE: 13
PIF_VALUE: 12
PIF_VALUE: 5
PIF_VALUE: 12
PIF_VALUE: 13
PIF_VALUE: 12
PIF_VALUE: 13
PIF_VALUE: 13
PIF_VALUE: 12
PIF_VALUE: 13
PIF_VALUE: 12
PIF_VALUE: 18
PIF_VALUE: 19
PIF_VALUE: 2
PIF_VALUE: 4
PIF_VALUE: 13
PIF_VALUE: 12
PIF_VALUE: 13
PIF_VALUE: 12
PIF_VALUE: 13
PIF_VALUE: 12
PIF_VALUE: 2
PIF_VALUE: 12
PIF_VALUE: 12

## 2021-07-16 ASSESSMENT — PAIN SCALES - GENERAL
PAINLEVEL_OUTOF10: 1
PAINLEVEL_OUTOF10: 5
PAINLEVEL_OUTOF10: 7
PAINLEVEL_OUTOF10: 6
PAINLEVEL_OUTOF10: 1
PAINLEVEL_OUTOF10: 5
PAINLEVEL_OUTOF10: 1
PAINLEVEL_OUTOF10: 5
PAINLEVEL_OUTOF10: 6
PAINLEVEL_OUTOF10: 7

## 2021-07-16 ASSESSMENT — PAIN DESCRIPTION - LOCATION
LOCATION: ABDOMEN

## 2021-07-16 ASSESSMENT — PAIN - FUNCTIONAL ASSESSMENT: PAIN_FUNCTIONAL_ASSESSMENT: 0-10

## 2021-07-16 ASSESSMENT — LIFESTYLE VARIABLES: SMOKING_STATUS: 0

## 2021-07-16 NOTE — OP NOTE
Operative Note  Department of Obstetrics and Gynecology  [address]       Patient: Miriam Siu   : 1980  MRN: [id number]       Acct: [de-identified]   PCP: No primary care provider on file. Date of Procedure: 21    Pre-operative Diagnosis: 45 y.o. female menorrhagia  Uterine fibroid   Anemia      Post-operative Diagnosis: same as above  Thickened endometrium   Uterine polyps  Submucosal fibroid     Procedure: Hysteroscopy dilation and curettage with polypectomy and submucosal myomectomy via Myosure device     Surgeon: Dr. Misael Lal MD     Assistant(s): Neris Nielsen DO, PGY3; Linnea Leonardo MD PGY1    Indications: The patient was seen in the office for complaints on ongoing menorrhagia. Anemia noted on outside records. TVUS was performed and significant for uterine fibroid (full report is not available). Hysteroscopy, dilation and curettage performed to view the uterine cavity and obtain biopsies. Procedure Details: The patient was seen in the pre-op room. The risks, benefits, complications, treatment options, and expected outcomes were discussed with the patient. The patient concurred with the proposed plan, giving informed consent. The patient was taken to the Operating Room and identified as Miriam Daily and the procedure was verified. A Time Out was held and the above information confirmed. After administration of general anesthesia, the patient was placed in the dorsolithotomy position with yellofin stirrups and examination under general anesthesia performed with findings as noted below. The patient was prepped and draped in the usual sterile fashion. A weighted speculum was placed into the vagina to visualize the cervix. The cervix was grasped with a single-tooth tenaculum. The uterus and the cervix were sounded and measured 10 cm. The cervix was dilated with shivam dilators to size 14.   Hysteroscope was inserted into the uterine cavity showing normal appearing endocervical cavity and thickened endometrial cavity with multiple polyps distorting the view of the cavity. Polyp forceps utilized to removed polyps which were collected and sent to pathology. Endometrial curettage was carried out with sharp curettage yielding curettings which were collected and sent for pathology examination. Hysteroscope was reinserted into the uterine cavity and a large submucosal fibroid was noted in the anterior, fundal position and taking up a significant portion of the endometrial cavity. Decision was made to proceed with myosure dissection of the fibroid. This proved to be difficult and the entire fibroid was unable to be removed however, a significant portion was removed. Myosure curettings were collected and sent to pathology. All instruments were then removed. Hemostasis was visualized at the tenaculum site on the cervix. Instrument, sponge, and needle counts were correct at the conclusion of the case. SCDs for DVT prophylaxis remain in place for the post operative period. Dr. Robert Cole was present for the entire operation. Findings:  10cm sized midposition uterus, thickened endometrium with multiple uterine polyps and a large submucosal fibroid. Bilateral ostia were unable to be visualized due to fibroid.   Estimated Blood Loss: Minimal  Drains:  None  Total IV Fluids: 800ml  Urine output: 200 ml clear urine after the case  Specimens:  Sharp endometrial curettings and mysoure curettings  Instrument and Sponge Count: Correct  Complications: none  Condition: stable, transfer to post anesthesia recovery    Randall Medina DO  Ob/Gyn Resident  7/17/2021, 7:13 AM

## 2021-07-16 NOTE — ANESTHESIA POSTPROCEDURE EVALUATION
Department of Anesthesiology  Postprocedure Note    Patient: Tez Grove  MRN: [id number]  [place]: 1980  Date of evaluation: 7/16/2021  Time:  7:28 PM     Procedure Summary     Date: 07/16/21 Room / Location: [address]    Anesthesia Start: 1011 Anesthesia Stop: 1814    Procedure: DILATATION AND CURETTAGE, [address] [place] [address] Dr, POLYPECTOMY, SUBMUCOSAL MYOMECTOMY (N/A ) Diagnosis: (MENORRHAGIA)    Surgeons: Jannet Eubanks MD Responsible Provider: Zurdo Evans MD    Anesthesia Type: general ASA Status: 2          Anesthesia Type: general    Robert Phase I: Robert Score: 10    Robert Phase II: Robert Score: 10    Last vitals: Reviewed and per EMR flowsheets.        Anesthesia Post Evaluation    Patient location during evaluation: PACU  Patient participation: complete - patient participated  Level of consciousness: awake and alert  Pain score: 1  Nausea & Vomiting: no nausea  Cardiovascular status: hemodynamically stable  Respiratory status: room air

## 2021-07-16 NOTE — PROGRESS NOTES
Writer verified patients ride home with Isma Agrawal [phone], she needs about 25 mins.  If no answer you can reach out to [facility]

## 2021-07-16 NOTE — ANESTHESIA PRE PROCEDURE
Department of Anesthesiology  Preprocedure Note       Name:  Cal Record   Age:  45 y.o.  :  1980                                          MRN:  [id number]         Date:  2021      Surgeon: Raman Posada):  Dilma Bonilla MD    Procedure: Procedure(s):  DILATATION AND CURETTAGE HYSTEROSCOPY    Department of Anesthesiology  Pre-Anesthesia Evaluation/Consultation         Name:  Cal Record                                         Age:  45 y.o. MRN:  [id number]             Medications  Current Facility-Administered Medications   Medication Dose Route Frequency Provider Last Rate Last Admin    lactated ringers infusion 1,000 mL  1,000 mL Intravenous Continuous Grazyna Hou MD           No Known Allergies  There is no problem list on file for this patient. Past Medical History:   Diagnosis Date    ADHD     Dr. Basil Trujillo Anemia     Dr. Gayle Cortés History of blood transfusion 2020    Dr. Debra Velazquez last seen 2020 Sees for anemia f/u    Wellness examination     [facility] last seen 2021  Meryle Dyers     Past Surgical History:   Procedure Laterality Date    ABDOMINAL ADHESION SURGERY  2013    removal of adhesions from bowel    ARM SURGERY Left 3/6/2020    LEFT   ARM FOREIGN BODY REMOVAL  (IV CATHETER) performed by Marietta Lemos MD at [address] [place] [address]      OTHER SURGICAL HISTORY  2013    bowel obstruction surgery     Social History     Tobacco Use    Smoking status: Former Smoker    Smokeless tobacco: Never Used    Tobacco comment: 3-4 cig/week   Vaping Use    Vaping Use: Never used   Substance Use Topics    Alcohol use: Yes     Alcohol/week: 8.0 standard drinks     Types: 8 Glasses of wine per week     Comment: social    Drug use: No         Vital Signs (Current) There were no vitals filed for this visit.   Vital Signs Statistics (for past 48 hrs)     No data recorded  BP Readings from Last 3 Encounters:   21 109/72 oral route for 5 days. Historical Provider, MD       Current medications:    Current Facility-Administered Medications   Medication Dose Route Frequency Provider Last Rate Last Admin    lactated ringers infusion 1,000 mL  1,000 mL Intravenous Continuous Yinka Palomino MD           Allergies:  No Known Allergies    Problem List:  There is no problem list on file for this patient. Past Medical History:        Diagnosis Date    ADHD     Dr. Jane Reyes neuro    Anemia     Dr. Mal Williamson History of blood transfusion 02/20/2020    Dr. Efren Stone last seen June 2020 Sees for anemia f/u    Wellness examination     [facility] last seen 7/2021  Mike Matias       Past Surgical History:        Procedure Laterality Date    ABDOMINAL ADHESION SURGERY  2013    removal of adhesions from bowel    ARM SURGERY Left 3/6/2020    LEFT   ARM FOREIGN BODY REMOVAL  (IV CATHETER) performed by Arti Contreras MD at [address] [place] [address]  2013    OTHER SURGICAL HISTORY  2013    bowel obstruction surgery       Social History:    Social History     Tobacco Use    Smoking status: Former Smoker    Smokeless tobacco: Never Used    Tobacco comment: 3-4 cig/week   Substance Use Topics    Alcohol use: Yes     Alcohol/week: 8.0 standard drinks     Types: 8 Glasses of wine per week     Comment: social                                Counseling given: Not Answered  Comment: 3-4 cig/week      Vital Signs (Current): There were no vitals filed for this visit.                                            BP Readings from Last 3 Encounters:   07/13/21 109/72   03/06/20 124/65   03/06/20 134/81       NPO Status: Time of last liquid consumption: 2200                        Time of last solid consumption: 2200                        Date of last liquid consumption: 07/15/21                        Date of last solid food consumption: 07/15/21    BMI:   Wt Readings from Last 3 Encounters:   07/13/21 155 lb (70.3 kg) 03/06/20 167 lb (75.8 kg)   03/02/20 169 lb 1.6 oz (76.7 kg)     There is no height or weight on file to calculate BMI.    CBC:   Lab Results   Component Value Date    WBC 5.3 07/13/2021    RBC 3.31 07/13/2021    HGB 8.0 07/13/2021    HCT 28.7 07/13/2021    MCV 86.7 07/13/2021    RDW 18.7 07/13/2021     07/13/2021       CMP: No results found for: NA, K, CL, CO2, BUN, CREATININE, GFRAA, AGRATIO, LABGLOM, GLUCOSE, PROT, CALCIUM, BILITOT, ALKPHOS, AST, ALT    POC Tests: No results for input(s): POCGLU, POCNA, POCK, POCCL, POCBUN, POCHEMO, POCHCT in the last 72 hours. Coags: No results found for: PROTIME, INR, APTT    HCG (If Applicable):   Lab Results   Component Value Date    PREGTESTUR neg 04/28/2018    HCG NEGATIVE 03/06/2020        ABGs: No results found for: PHART, PO2ART, QYY2EDB, IVZ9MAP, BEART, U2RUHBEZ     Type & Screen (If Applicable):  No results found for: LABABO, LABRH    Drug/Infectious Status (If Applicable):  No results found for: HIV, HEPCAB    COVID-19 Screening (If Applicable): No results found for: COVID19        Anesthesia Evaluation   no history of anesthetic complications:   Airway: Mallampati: II     Neck ROM: full   Dental:          Pulmonary:       (-) recent URI and not a current smoker                           Cardiovascular:  Exercise tolerance: good (>4 METS),                     Neuro/Psych:      (-) seizures            ROS comment: ADHD GI/Hepatic/Renal:            ROS comment: Gastric bypass. Endo/Other:    (+) blood dyscrasia: anemia:., .    (-) diabetes mellitus               Abdominal:             Vascular: Other Findings:             Anesthesia Plan      general     ASA 2       Induction: intravenous.                           Drea Weeks MD   7/16/2021

## 2021-07-17 PROBLEM — D25.0 SUBMUCOUS UTERINE FIBROID: Status: ACTIVE | Noted: 2021-07-17

## 2021-07-17 PROBLEM — D64.9 ANEMIA: Status: ACTIVE | Noted: 2021-07-17

## 2021-07-19 LAB — SURGICAL PATHOLOGY REPORT: NORMAL

## 2021-08-13 ENCOUNTER — INITIAL CONSULT (OUTPATIENT)
Dept: GYNECOLOGIC ONCOLOGY | Age: 41
End: 2021-08-13
Payer: MEDICAID

## 2021-08-13 VITALS
HEIGHT: 66 IN | WEIGHT: 157.6 LBS | DIASTOLIC BLOOD PRESSURE: 83 MMHG | HEART RATE: 102 BPM | BODY MASS INDEX: 25.33 KG/M2 | SYSTOLIC BLOOD PRESSURE: 133 MMHG | OXYGEN SATURATION: 99 % | TEMPERATURE: 98.2 F

## 2021-08-13 DIAGNOSIS — N85.00 ENDOMETRIAL HYPERPLASIA WITHOUT ATYPIA: Primary | ICD-10-CM

## 2021-08-13 PROCEDURE — G8427 DOCREV CUR MEDS BY ELIG CLIN: HCPCS | Performed by: OBSTETRICS & GYNECOLOGY

## 2021-08-13 PROCEDURE — G8419 CALC BMI OUT NRM PARAM NOF/U: HCPCS | Performed by: OBSTETRICS & GYNECOLOGY

## 2021-08-13 PROCEDURE — 99242 OFF/OP CONSLTJ NEW/EST SF 20: CPT | Performed by: OBSTETRICS & GYNECOLOGY

## 2021-08-13 NOTE — PROGRESS NOTES
[address], [facility], [address]  [place] [id number]      I am seeing Toni Mariano for Consultation at the request of Dr. Rolando Avery, OB/GYN. .    Chief Complaint: Menorrhagia. Anemia and uterine fibroid    HPI  She is a 45 y.o. G2, P2 (1 vaginal and one  section) female who is being referred for ongoing menorrhagia resulting in anemia. The patient saw Dr. Keesha Hickman MD, OB/GYN who recommended a D&C and hysteroscopy and removal of a submucous fibroid. The surgery was performed on 2021. Final pathology revealed \"endometrium and polyps with complex hyperplasia without atypia\". In addition there were \"numerous smooth muscle fragments consistent with leiomyoma\". Patient has done fairly well since the surgery. She states she wishes to have at least one more child. Dr. Keesha Hickman has considered placing her on Megace but has referred her for an opinion on possible hysterectomy. Review of Systems  I have personally reviewed and agree with the review of systems done by my ancillary staff in the [facility] documentation. OBJECTIVE:  Vitals:    21 1458   BP: 133/83   Pulse: 102   Temp: 98.2 °F (36.8 °C)   TempSrc: Temporal   SpO2: 99%   Weight: 157 lb 9.6 oz (71.5 kg)   Height: 5' 6\" (1.676 m)       General: Alert and oriented x3, no acute distress    Further examination was not performed but rather a discussion was held regarding patient's management options at this point in time. Assessment:  1. Endometrial hyperplasia without atypia    2. Submucous myoma, removed  3. Desire for fertility      Discussion:    A discussion was held with the patient concerning her current situation. I explained complex endometrial hyperplasia without atypia is treated with high-dose progesterone. She should have careful periodic follow-up with endometrial biopsies to ensure that the hyperplasia has cleared.   Once cleared the patient could begin to attempt pregnancy. She understands that pregnancy may be difficult at her age . Various methods to treat with progesterone were discussed including oral, injection and progesterone emitting IUD. I agree with Dr. Tj Mckeon that Megace is a good short-term option. However recently I have preferred the progesterone emitting IUD. I believe it is more effective with fewer systemic side effects. I sample the endometrium every 3 months and once the patient has had 2 negative biopsies I remove the IUD and allow the patient to attempt pregnancy. Weight loss is also encouraged. Hysterectomy would be performe as a last resort if the biopsy reveals atypia or neoplasia or if severe cramping or heavy bleeding returns. At this point is only 3 weeks postop. She will see Dr. Tj Mckeon again in several weeks for her postoperative visit to begin either Megace therapy or to plan on having a Mirena IUD placed. Plan:    1. See Dr. Tj Mckeon at postop visit    2. Begin progesterone therapy with either oral Megace or progesterone emitting IUD    3. Every 3 month endometrial biopsies to ensure hyperplasia has cleared prior to removing IUD so that pregnancy may be attempted. Follow Up: As needed          I spent 30 minutes providing care to the patient and >50% of the time was spent counseling and conoordinating care, discussing the patient's current situation, reviewing her options, counseling and education her and answering her questions. All of the patient's pertinent images, labs and previous records and procedures were reviewed.     Electronically signed by Woody Vinson MD on 8/13/21 at 3:18 PM EDT

## 2022-06-15 ENCOUNTER — TELEPHONE (OUTPATIENT)
Dept: GYNECOLOGIC ONCOLOGY | Age: 42
End: 2022-06-15

## 2022-06-15 NOTE — TELEPHONE ENCOUNTER
Spoke to pt and asked if she has had follow up with Dr .Saintclair Li as recommended since visit with Quang Bond. She states she has followed up with her and is due to schedule another appointment with her. Notified she can call office as needed. She voiced understanding. ZITA Deshpande notified.

## 2023-09-12 ENCOUNTER — HOSPITAL ENCOUNTER (EMERGENCY)
Age: 43
Discharge: HOME | End: 2023-09-12
Attending: EMERGENCY MEDICINE
Payer: COMMERCIAL

## 2023-09-12 ENCOUNTER — APPOINTMENT (OUTPATIENT)
Dept: GENERAL RADIOLOGY | Age: 43
End: 2023-09-12
Payer: COMMERCIAL

## 2023-09-12 ENCOUNTER — APPOINTMENT (OUTPATIENT)
Dept: CT IMAGING | Age: 43
End: 2023-09-12
Payer: COMMERCIAL

## 2023-09-12 VITALS
HEIGHT: 66 IN | HEART RATE: 88 BPM | OXYGEN SATURATION: 100 % | DIASTOLIC BLOOD PRESSURE: 77 MMHG | TEMPERATURE: 98.4 F | RESPIRATION RATE: 16 BRPM | BODY MASS INDEX: 24.91 KG/M2 | SYSTOLIC BLOOD PRESSURE: 118 MMHG | WEIGHT: 155 LBS

## 2023-09-12 DIAGNOSIS — S30.1XXA CONTUSION OF ABDOMINAL WALL, INITIAL ENCOUNTER: Primary | ICD-10-CM

## 2023-09-12 LAB
ALBUMIN SERPL-MCNC: 3.7 G/DL (ref 3.5–5.2)
ALP SERPL-CCNC: 109 U/L (ref 35–104)
ALT SERPL-CCNC: 25 U/L (ref 5–33)
ANION GAP SERPL CALCULATED.3IONS-SCNC: 9 MMOL/L (ref 9–17)
AST SERPL-CCNC: 47 U/L
BASOPHILS # BLD: 0 K/UL (ref 0–0.2)
BASOPHILS NFR BLD: 0 % (ref 0–2)
BILIRUB SERPL-MCNC: 0.6 MG/DL (ref 0.3–1.2)
BUN SERPL-MCNC: 6 MG/DL (ref 6–20)
BUN/CREAT SERPL: 10 (ref 9–20)
CALCIUM SERPL-MCNC: 9.2 MG/DL (ref 8.6–10.4)
CHLORIDE SERPL-SCNC: 102 MMOL/L (ref 98–107)
CO2 SERPL-SCNC: 26 MMOL/L (ref 20–31)
CREAT SERPL-MCNC: 0.6 MG/DL (ref 0.5–0.9)
EOSINOPHIL # BLD: 0.08 K/UL (ref 0–0.44)
EOSINOPHILS RELATIVE PERCENT: 1 % (ref 1–4)
ERYTHROCYTE [DISTWIDTH] IN BLOOD BY AUTOMATED COUNT: 22.5 % (ref 11.8–14.4)
GFR SERPL CREATININE-BSD FRML MDRD: >60 ML/MIN/1.73M2
GLUCOSE SERPL-MCNC: 100 MG/DL (ref 70–99)
HCG SERPL QL: NEGATIVE
HCT VFR BLD AUTO: 32.1 % (ref 36.3–47.1)
HGB BLD-MCNC: 9.8 G/DL (ref 11.9–15.1)
IMM GRANULOCYTES # BLD AUTO: 0 K/UL (ref 0–0.3)
IMM GRANULOCYTES NFR BLD: 0 %
INR PPP: 1.3
LYMPHOCYTES NFR BLD: 1.13 K/UL (ref 1.1–3.7)
LYMPHOCYTES RELATIVE PERCENT: 14 % (ref 24–43)
MCH RBC QN AUTO: 30 PG (ref 25.2–33.5)
MCHC RBC AUTO-ENTMCNC: 30.5 G/DL (ref 28.4–34.8)
MCV RBC AUTO: 98.2 FL (ref 82.6–102.9)
MONOCYTES NFR BLD: 0.89 K/UL (ref 0.1–1.2)
MONOCYTES NFR BLD: 11 % (ref 3–12)
MORPHOLOGY: ABNORMAL
MORPHOLOGY: ABNORMAL
NEUTROPHILS NFR BLD: 74 % (ref 36–65)
NEUTS SEG NFR BLD: 6 K/UL (ref 1.5–8.1)
NRBC BLD-RTO: 0 PER 100 WBC
PARTIAL THROMBOPLASTIN TIME: 29.2 SEC (ref 23.9–33.8)
PLATELET # BLD AUTO: 865 K/UL (ref 138–453)
PMV BLD AUTO: 9 FL (ref 8.1–13.5)
POTASSIUM SERPL-SCNC: 4.5 MMOL/L (ref 3.7–5.3)
PROT SERPL-MCNC: 6.5 G/DL (ref 6.4–8.3)
PROTHROMBIN TIME: 16.1 SEC (ref 11.5–14.2)
RBC # BLD AUTO: 3.27 M/UL (ref 3.95–5.11)
SODIUM SERPL-SCNC: 137 MMOL/L (ref 135–144)
WBC OTHER # BLD: 8.1 K/UL (ref 3.5–11.3)

## 2023-09-12 PROCEDURE — 85610 PROTHROMBIN TIME: CPT

## 2023-09-12 PROCEDURE — 6360000004 HC RX CONTRAST MEDICATION: Performed by: EMERGENCY MEDICINE

## 2023-09-12 PROCEDURE — 85025 COMPLETE CBC W/AUTO DIFF WBC: CPT

## 2023-09-12 PROCEDURE — 71045 X-RAY EXAM CHEST 1 VIEW: CPT

## 2023-09-12 PROCEDURE — 80053 COMPREHEN METABOLIC PANEL: CPT

## 2023-09-12 PROCEDURE — 85730 THROMBOPLASTIN TIME PARTIAL: CPT

## 2023-09-12 PROCEDURE — 84703 CHORIONIC GONADOTROPIN ASSAY: CPT

## 2023-09-12 PROCEDURE — 74177 CT ABD & PELVIS W/CONTRAST: CPT

## 2023-09-12 PROCEDURE — 99285 EMERGENCY DEPT VISIT HI MDM: CPT

## 2023-09-12 PROCEDURE — 2580000003 HC RX 258: Performed by: EMERGENCY MEDICINE

## 2023-09-12 RX ORDER — 0.9 % SODIUM CHLORIDE 0.9 %
80 INTRAVENOUS SOLUTION INTRAVENOUS ONCE
Status: COMPLETED | OUTPATIENT
Start: 2023-09-12 | End: 2023-09-12

## 2023-09-12 RX ORDER — SODIUM CHLORIDE 0.9 % (FLUSH) 0.9 %
10 SYRINGE (ML) INJECTION PRN
Status: DISCONTINUED | OUTPATIENT
Start: 2023-09-12 | End: 2023-09-12 | Stop reason: HOSPADM

## 2023-09-12 RX ADMIN — SODIUM CHLORIDE 80 ML: 9 INJECTION, SOLUTION INTRAVENOUS at 17:23

## 2023-09-12 RX ADMIN — SODIUM CHLORIDE, PRESERVATIVE FREE 10 ML: 5 INJECTION INTRAVENOUS at 17:23

## 2023-09-12 RX ADMIN — IOPAMIDOL 75 ML: 755 INJECTION, SOLUTION INTRAVENOUS at 17:23

## 2023-09-12 ASSESSMENT — ENCOUNTER SYMPTOMS
BACK PAIN: 0
SORE THROAT: 0
EYE REDNESS: 0
VOMITING: 0
EYE PAIN: 0
COUGH: 0
DIARRHEA: 0
SHORTNESS OF BREATH: 0
ABDOMINAL PAIN: 1

## 2023-09-12 ASSESSMENT — PAIN DESCRIPTION - ORIENTATION: ORIENTATION: RIGHT

## 2023-09-12 ASSESSMENT — PAIN DESCRIPTION - LOCATION: LOCATION: BACK;CHEST;ABDOMEN

## 2023-09-12 ASSESSMENT — PAIN - FUNCTIONAL ASSESSMENT: PAIN_FUNCTIONAL_ASSESSMENT: 0-10

## 2023-09-12 ASSESSMENT — PAIN DESCRIPTION - FREQUENCY: FREQUENCY: CONTINUOUS

## 2023-09-12 ASSESSMENT — PAIN SCALES - GENERAL: PAINLEVEL_OUTOF10: 5
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
## 2023-09-12 NOTE — DISCHARGE INSTRUCTIONS
Follow up closely with PCP if symptoms such as rectal bleeding do not improve.  Return here with consistent rectal bleeding with concerns for too much blood loss

## 2024-12-19 ENCOUNTER — TELEPHONE (OUTPATIENT)
Dept: OBGYN CLINIC | Age: 44
End: 2024-12-19

## 2024-12-19 NOTE — TELEPHONE ENCOUNTER
NEW OB pt    LMP:?10/06/24 or 11/06/24 Pt could not remember.      Pt called to get scheduled.      We currently do not have any openings for NEW pt OB please advise where you would like her to be seen.

## 2025-01-23 ENCOUNTER — OFFICE VISIT (OUTPATIENT)
Dept: OBGYN CLINIC | Age: 45
End: 2025-01-23

## 2025-01-23 ENCOUNTER — HOSPITAL ENCOUNTER (OUTPATIENT)
Age: 45
Setting detail: SPECIMEN
Discharge: HOME | End: 2025-01-23

## 2025-01-23 VITALS
BODY MASS INDEX: 26.36 KG/M2 | SYSTOLIC BLOOD PRESSURE: 130 MMHG | DIASTOLIC BLOOD PRESSURE: 74 MMHG | HEART RATE: 90 BPM | HEIGHT: 66 IN | WEIGHT: 164 LBS

## 2025-01-23 DIAGNOSIS — O34.219 PREGNANCY WITH HISTORY OF CESAREAN SECTION, ANTEPARTUM: ICD-10-CM

## 2025-01-23 DIAGNOSIS — Z86.32 HISTORY OF GESTATIONAL DIABETES IN PRIOR PREGNANCY, CURRENTLY PREGNANT: ICD-10-CM

## 2025-01-23 DIAGNOSIS — D25.9 UTERINE FIBROID COMPLICATING ANTENATAL CARE, BABY NOT YET DELIVERED: Primary | ICD-10-CM

## 2025-01-23 DIAGNOSIS — O09.521 ADVANCED MATERNAL AGE IN MULTIGRAVIDA, FIRST TRIMESTER: ICD-10-CM

## 2025-01-23 DIAGNOSIS — Z86.718 HISTORY OF DVT OF LOWER EXTREMITY: ICD-10-CM

## 2025-01-23 DIAGNOSIS — Z86.73 HISTORY OF ISCHEMIC STROKE: ICD-10-CM

## 2025-01-23 DIAGNOSIS — Z32.01 POSITIVE PREGNANCY TEST: ICD-10-CM

## 2025-01-23 DIAGNOSIS — Z98.890 HISTORY OF MYOMECTOMY: ICD-10-CM

## 2025-01-23 DIAGNOSIS — Z3A.10 10 WEEKS GESTATION OF PREGNANCY: ICD-10-CM

## 2025-01-23 DIAGNOSIS — Z86.711 HISTORY OF PULMONARY EMBOLISM: ICD-10-CM

## 2025-01-23 DIAGNOSIS — O09.299 HISTORY OF GESTATIONAL DIABETES IN PRIOR PREGNANCY, CURRENTLY PREGNANT: ICD-10-CM

## 2025-01-23 DIAGNOSIS — O34.10 UTERINE FIBROID COMPLICATING ANTENATAL CARE, BABY NOT YET DELIVERED: Primary | ICD-10-CM

## 2025-01-23 DIAGNOSIS — Z98.84 H/O GASTRIC BYPASS: ICD-10-CM

## 2025-01-23 DIAGNOSIS — Z87.19 HX OF INTESTINAL OBSTRUCTION: ICD-10-CM

## 2025-01-23 DIAGNOSIS — Z32.01 POSITIVE PREGNANCY TEST: Primary | ICD-10-CM

## 2025-01-23 PROBLEM — D25.1 INTRAMURAL LEIOMYOMA OF UTERUS: Status: ACTIVE | Noted: 2025-01-23

## 2025-01-23 LAB
AMPHET UR QL SCN: NEGATIVE
BACTERIA URNS QL MICRO: ABNORMAL
BARBITURATES UR QL SCN: NEGATIVE
BENZODIAZ UR QL: NEGATIVE
BILIRUB UR QL STRIP: NEGATIVE
C TRACH DNA SPEC QL PROBE+SIG AMP: NORMAL
CANNABINOIDS UR QL SCN: POSITIVE
CASTS #/AREA URNS LPF: ABNORMAL /LPF (ref 0–8)
CLARITY UR: ABNORMAL
COCAINE UR QL SCN: NEGATIVE
COLOR UR: ABNORMAL
CREAT UR-MCNC: 222 MG/DL (ref 28–217)
EPI CELLS #/AREA URNS HPF: ABNORMAL /HPF (ref 0–5)
FENTANYL UR QL: NEGATIVE
GLUCOSE UR STRIP-MCNC: NEGATIVE MG/DL
HGB UR QL STRIP.AUTO: NEGATIVE
KETONES UR STRIP-MCNC: NEGATIVE MG/DL
LEUKOCYTE ESTERASE UR QL STRIP: NEGATIVE
METHADONE UR QL: NEGATIVE
N GONORRHOEA DNA SPEC QL PROBE+SIG AMP: NORMAL
NITRITE UR QL STRIP: NEGATIVE
OPIATES UR QL SCN: NEGATIVE
OXYCODONE UR QL SCN: NEGATIVE
PCP UR QL SCN: NEGATIVE
PH UR STRIP: 6.5 [PH] (ref 5–8)
PROT UR STRIP-MCNC: ABNORMAL MG/DL
RBC #/AREA URNS HPF: ABNORMAL /HPF (ref 0–4)
SP GR UR STRIP: 1.03 (ref 1–1.03)
SPECIMEN DESCRIPTION: NORMAL
TEST INFORMATION: ABNORMAL
TOTAL PROTEIN, URINE: 86 MG/DL
URINE TOTAL PROTEIN CREATININE RATIO: 0.39 (ref 0–0.2)
UROBILINOGEN UR STRIP-ACNC: NORMAL EU/DL (ref 0–1)
WBC #/AREA URNS HPF: ABNORMAL /HPF (ref 0–5)

## 2025-01-23 RX ORDER — ONDANSETRON 4 MG/1
4 TABLET, ORALLY DISINTEGRATING ORAL 3 TIMES DAILY PRN
Qty: 21 TABLET | Refills: 0 | Status: SHIPPED | OUTPATIENT
Start: 2025-01-23

## 2025-01-23 ASSESSMENT — ENCOUNTER SYMPTOMS
NAUSEA: 0
CONSTIPATION: 0
VOMITING: 0
BACK PAIN: 0
ABDOMINAL PAIN: 0
CHEST TIGHTNESS: 0
SHORTNESS OF BREATH: 0

## 2025-01-23 NOTE — PROGRESS NOTES
[facility], [facility]  [address]  [address]  [place] [place] [address]  Dept: [phone]      CC: Confirmation of pregnancy   Chief Complaint   Patient presents with    Confirmation of Pregnancy        Subjective:   Alkesandra Vanegas is being seen today for confirmation of pregnancy. She is a  with her last menstrual period starting: Patient's last menstrual period was 2024., Pelvic ultrasound performed today, estimated gestational age is 10w6d based on lmp. This is not a planned pregnancy. Recently had consultation for a planned hysterectomy.     She has a history of multiple blood clots but no blood clotting disorder? She does not have antiphosphlipid antibody syndrome although a hematology NP recently misdiagnosed her with this. She follows with a hematologist doctor who has told her she does not have APA.     Pregnancy history fully reviewed. She has two living children. Her daughter was born vaginally 2001 - with this pregnancy she had an amniocentesis and had GDM during this pregnancy. Her son was born 2009 - she does not recall any health issues with this pregnancy but did have a c section reportedly due to acyncliticism. Will request records.     She:  -denies History of  delivery or short cervix   -admits to History of diabetes/GDM, having a parent or sibling with diabetes  -denies History of CHTN, preeclampsia or gestational hypertension  -denies personal/family history of any bleeding/clotting disorders.    No clotting disorder but multiple clots. 2024 is when she had her most recent dvt - was off of eloquis due to insurance issue.    -admits to History of severe anemia,  -denies shoulder dystocia,   postpartum hemorrhage,   IUGR,   macrosomia,   hyperemesis or   4th degree lacerations   -denies History of genital HSV  -admits to History of uterine surgeries - DILATATION AND CURETTAGE,

## 2025-01-24 LAB
CANDIDA SPECIES: NEGATIVE
GARDNERELLA VAGINALIS: POSITIVE
MICROORGANISM SPEC CULT: NO GROWTH
SERVICE CMNT-IMP: NORMAL
SOURCE: ABNORMAL
SPECIMEN DESCRIPTION: NORMAL
TRICHOMONAS: NEGATIVE

## 2025-01-24 RX ORDER — METRONIDAZOLE 7.5 MG/G
1 GEL VAGINAL NIGHTLY
Qty: 1 EACH | Refills: 0 | Status: SHIPPED | OUTPATIENT
Start: 2025-01-24 | End: 2025-01-31

## 2025-01-27 ENCOUNTER — HOSPITAL ENCOUNTER (OUTPATIENT)
Age: 45
Setting detail: SPECIMEN
Discharge: HOME | End: 2025-01-27

## 2025-01-27 ENCOUNTER — INITIAL PRENATAL (OUTPATIENT)
Dept: OBGYN CLINIC | Age: 45
End: 2025-01-27
Payer: COMMERCIAL

## 2025-01-27 VITALS
DIASTOLIC BLOOD PRESSURE: 72 MMHG | WEIGHT: 166 LBS | SYSTOLIC BLOOD PRESSURE: 112 MMHG | BODY MASS INDEX: 26.68 KG/M2 | HEIGHT: 66 IN

## 2025-01-27 DIAGNOSIS — Z86.711 HISTORY OF PULMONARY EMBOLISM: ICD-10-CM

## 2025-01-27 DIAGNOSIS — O26.21 HABITUAL ABORTER, CURRENTLY PREGNANT IN FIRST TRIMESTER: ICD-10-CM

## 2025-01-27 DIAGNOSIS — Z32.01 POSITIVE PREGNANCY TEST: ICD-10-CM

## 2025-01-27 DIAGNOSIS — O09.521 ADVANCED MATERNAL AGE IN MULTIGRAVIDA, FIRST TRIMESTER: ICD-10-CM

## 2025-01-27 DIAGNOSIS — Z3A.11 11 WEEKS GESTATION OF PREGNANCY: Primary | ICD-10-CM

## 2025-01-27 DIAGNOSIS — Z86.73 HISTORY OF ISCHEMIC STROKE: ICD-10-CM

## 2025-01-27 DIAGNOSIS — Z86.718 HISTORY OF DVT OF LOWER EXTREMITY: ICD-10-CM

## 2025-01-27 DIAGNOSIS — O34.219 PREGNANCY WITH HISTORY OF CESAREAN SECTION, ANTEPARTUM: ICD-10-CM

## 2025-01-27 DIAGNOSIS — Z98.84 H/O GASTRIC BYPASS: ICD-10-CM

## 2025-01-27 DIAGNOSIS — Z3A.10 10 WEEKS GESTATION OF PREGNANCY: ICD-10-CM

## 2025-01-27 DIAGNOSIS — Z87.74 HISTORY OF CONGENITAL HEART DEFECT: ICD-10-CM

## 2025-01-27 PROBLEM — N92.0 MENORRHAGIA: Status: ACTIVE | Noted: 2021-06-23

## 2025-01-27 LAB
ABO + RH BLD: NORMAL
ALBUMIN SERPL-MCNC: 3.7 G/DL (ref 3.5–5.2)
ALBUMIN/GLOB SERPL: 1.3 {RATIO} (ref 1–2.5)
ALP SERPL-CCNC: 52 U/L (ref 35–104)
ALT SERPL-CCNC: 18 U/L (ref 10–35)
ANION GAP SERPL CALCULATED.3IONS-SCNC: 12 MMOL/L (ref 9–16)
AST SERPL-CCNC: 21 U/L (ref 10–35)
BASOPHILS # BLD: 0.03 K/UL (ref 0–0.2)
BASOPHILS NFR BLD: 0 % (ref 0–2)
BILIRUB SERPL-MCNC: 0.2 MG/DL (ref 0–1.2)
BLOOD GROUP ANTIBODIES SERPL: NEGATIVE
BUN SERPL-MCNC: 6 MG/DL (ref 6–20)
C TRACH DNA SPEC QL PROBE+SIG AMP: NEGATIVE
CALCIUM SERPL-MCNC: 9.6 MG/DL (ref 8.6–10.4)
CHLORIDE SERPL-SCNC: 102 MMOL/L (ref 98–107)
CO2 SERPL-SCNC: 20 MMOL/L (ref 20–31)
CREAT SERPL-MCNC: 0.5 MG/DL (ref 0.6–0.9)
EOSINOPHIL # BLD: 0.14 K/UL (ref 0–0.44)
EOSINOPHILS RELATIVE PERCENT: 2 % (ref 1–4)
ERYTHROCYTE [DISTWIDTH] IN BLOOD BY AUTOMATED COUNT: 15.2 % (ref 11.8–14.4)
EST. AVERAGE GLUCOSE BLD GHB EST-MCNC: 94 MG/DL
GFR, ESTIMATED: >90 ML/MIN/1.73M2
GLUCOSE SERPL-MCNC: 84 MG/DL (ref 74–99)
HBA1C MFR BLD: 4.9 % (ref 4–6)
HBV SURFACE AG SERPL QL IA: NONREACTIVE
HCT VFR BLD AUTO: 37.9 % (ref 36.3–47.1)
HCV AB SERPL QL IA: NONREACTIVE
HGB BLD-MCNC: 12.6 G/DL (ref 11.9–15.1)
HIV 1+2 AB+HIV1 P24 AG SERPL QL IA: NONREACTIVE
IMM GRANULOCYTES # BLD AUTO: 0.03 K/UL (ref 0–0.3)
IMM GRANULOCYTES NFR BLD: 0 %
LYMPHOCYTES NFR BLD: 1.69 K/UL (ref 1.1–3.7)
LYMPHOCYTES RELATIVE PERCENT: 18 % (ref 24–43)
MCH RBC QN AUTO: 30.8 PG (ref 25.2–33.5)
MCHC RBC AUTO-ENTMCNC: 33.2 G/DL (ref 28.4–34.8)
MCV RBC AUTO: 92.7 FL (ref 82.6–102.9)
MONOCYTES NFR BLD: 0.68 K/UL (ref 0.1–1.2)
MONOCYTES NFR BLD: 7 % (ref 3–12)
N GONORRHOEA DNA SPEC QL PROBE+SIG AMP: NEGATIVE
NEUTROPHILS NFR BLD: 73 % (ref 36–65)
NEUTS SEG NFR BLD: 6.85 K/UL (ref 1.5–8.1)
NRBC BLD-RTO: 0 PER 100 WBC
PLATELET # BLD AUTO: 320 K/UL (ref 138–453)
PMV BLD AUTO: 10.5 FL (ref 8.1–13.5)
POTASSIUM SERPL-SCNC: 3.9 MMOL/L (ref 3.7–5.3)
PROT SERPL-MCNC: 6.5 G/DL (ref 6.6–8.7)
RBC # BLD AUTO: 4.09 M/UL (ref 3.95–5.11)
RBC # BLD: ABNORMAL 10*6/UL
RUBV IGG SERPL QL IA: 487 IU/ML
SODIUM SERPL-SCNC: 134 MMOL/L (ref 136–145)
SPECIMEN DESCRIPTION: NORMAL
T PALLIDUM AB SER QL IA: NONREACTIVE
WBC OTHER # BLD: 9.4 K/UL (ref 3.5–11.3)

## 2025-01-27 PROCEDURE — 99211 OFF/OP EST MAY X REQ PHY/QHP: CPT | Performed by: STUDENT IN AN ORGANIZED HEALTH CARE EDUCATION/TRAINING PROGRAM

## 2025-01-27 RX ORDER — BUSPIRONE HYDROCHLORIDE 5 MG/1
TABLET ORAL
COMMUNITY

## 2025-01-27 RX ORDER — BUSPIRONE HYDROCHLORIDE 5 MG/1
5 TABLET ORAL EVERY MORNING
COMMUNITY
Start: 2024-03-28

## 2025-01-27 RX ORDER — AMOXICILLIN 500 MG/1
TABLET, FILM COATED ORAL
COMMUNITY
Start: 2024-11-04 | End: 2025-01-27

## 2025-01-27 RX ORDER — ATORVASTATIN CALCIUM 40 MG/1
TABLET, FILM COATED ORAL
COMMUNITY
Start: 2024-03-28

## 2025-01-27 RX ORDER — ERGOCALCIFEROL 1.25 MG/1
50000 CAPSULE, LIQUID FILLED ORAL WEEKLY
COMMUNITY

## 2025-01-27 RX ORDER — ENOXAPARIN SODIUM 100 MG/ML
1 INJECTION SUBCUTANEOUS 2 TIMES DAILY
COMMUNITY

## 2025-01-27 RX ORDER — AMLODIPINE BESYLATE 5 MG/1
5 TABLET ORAL DAILY
COMMUNITY
Start: 2024-07-25

## 2025-01-27 SDOH — ECONOMIC STABILITY: FOOD INSECURITY: WITHIN THE PAST 12 MONTHS, THE FOOD YOU BOUGHT JUST DIDN'T LAST AND YOU DIDN'T HAVE MONEY TO GET MORE.: NEVER TRUE

## 2025-01-27 SDOH — ECONOMIC STABILITY: FOOD INSECURITY: WITHIN THE PAST 12 MONTHS, YOU WORRIED THAT YOUR FOOD WOULD RUN OUT BEFORE YOU GOT MONEY TO BUY MORE.: NEVER TRUE

## 2025-01-27 NOTE — PROGRESS NOTES
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  This is a planned pregnancy:NO, not preventing  Certain LMP:Yes   S/S of pregnancy:Yes, missed period  Hx N/V pregnancy:none       Mother's ethnicity:   Father's ethnicity:        Patient Active Problem List   Diagnosis    Menorrhagia    s/p Hscope D&C, myosure 21    Submucous uterine fibroid    Anemia    Endometrial hyperplasia without atypia    History of DVT of lower extremity    History of gestational diabetes in prior pregnancy, currently pregnant    H/O gastric bypass    Hx of intestinal obstruction    History of myomectomy    History of pulmonary embolism    History of cerebrovascular accident (CVA) due to ischemia    Pregnancy with history of  section, antepartum    Advanced maternal age in multigravida, first trimester    Intramural leiomyoma of uterus    Uterine fibroid complicating  care, baby not yet delivered     Blood pressure 112/72, height 1.676 m (5' 6\"), weight 75.3 kg (166 lb), last menstrual period 2024.      Aleksandra Vanegas is a 44 y.o. , here for her ACOG. The patients past medical, surgical, social and family history were reviewed.  Current medications and allergies were reviewed, and documented in the chart.      Menstrual history: monthly  Birth control: Mirena     Wt Readings from Last 3 Encounters:   25 75.3 kg (166 lb)   25 74.4 kg (164 lb)   23 70.3 kg (155 lb)     Recent Results (from the past 8736 hour(s))   Urinalysis with Microscopic    Collection Time: 25  9:15 AM   Result Value Ref Range    Color, UA Dark Yellow (A) Yellow    Turbidity UA Cloudy (A) Clear    Glucose, Ur NEGATIVE NEGATIVE mg/dL    Bilirubin, Urine NEGATIVE NEGATIVE    Ketones, Urine NEGATIVE NEGATIVE mg/dL    Specific

## 2025-01-30 LAB
HGB ELECTROPHORESIS INTERP: NORMAL
PATHOLOGIST: NORMAL

## 2025-02-04 ENCOUNTER — ROUTINE PRENATAL (OUTPATIENT)
Dept: PERINATAL CARE | Age: 45
End: 2025-02-04
Payer: COMMERCIAL

## 2025-02-04 VITALS
BODY MASS INDEX: 26.52 KG/M2 | WEIGHT: 165 LBS | HEART RATE: 88 BPM | RESPIRATION RATE: 16 BRPM | SYSTOLIC BLOOD PRESSURE: 124 MMHG | TEMPERATURE: 98.1 F | DIASTOLIC BLOOD PRESSURE: 82 MMHG | HEIGHT: 66 IN

## 2025-02-04 DIAGNOSIS — O09.521 ADVANCED MATERNAL AGE IN MULTIGRAVIDA, FIRST TRIMESTER: ICD-10-CM

## 2025-02-04 DIAGNOSIS — Z86.73 HISTORY OF CEREBROVASCULAR ACCIDENT (CVA) DUE TO ISCHEMIA: ICD-10-CM

## 2025-02-04 DIAGNOSIS — Z86.718 HISTORY OF DVT OF LOWER EXTREMITY: ICD-10-CM

## 2025-02-04 DIAGNOSIS — D25.0 SUBMUCOUS UTERINE FIBROID: ICD-10-CM

## 2025-02-04 DIAGNOSIS — Z86.711 HISTORY OF PULMONARY EMBOLISM: ICD-10-CM

## 2025-02-04 DIAGNOSIS — Z98.84 H/O GASTRIC BYPASS: ICD-10-CM

## 2025-02-04 DIAGNOSIS — Z34.90 SIXTH PREGNANCY: Primary | ICD-10-CM

## 2025-02-04 DIAGNOSIS — D68.61 ANTIPHOSPHOLIPID SYNDROME (HCC): ICD-10-CM

## 2025-02-04 DIAGNOSIS — O34.219 PREGNANCY WITH HISTORY OF CESAREAN SECTION, ANTEPARTUM: ICD-10-CM

## 2025-02-04 DIAGNOSIS — Z98.891 HX OF CESAREAN SECTION: ICD-10-CM

## 2025-02-04 LAB
Lab: NEGATIVE
Lab: NORMAL
NTRA 1P36 DELETION SYNDROME POPULATION-BASED RISK TEXT: NORMAL
NTRA 1P36 DELETION SYNDROME RESULT TEXT: NORMAL
NTRA 1P36 DELETION SYNDROME RISK SCORE TEXT: NORMAL
NTRA 22Q11.2 DELETION SYNDROME POPULATION-BASED RISK TEXT: NORMAL
NTRA 22Q11.2 DELETION SYNDROME RESULT TEXT: NORMAL
NTRA 22Q11.2 DELETION SYNDROME RISK SCORE TEXT: NORMAL
NTRA ALPHA-THALASSEMIA: NEGATIVE
NTRA ANGELMAN SYNDROME POPULATION-BASED RISK TEXT: NORMAL
NTRA ANGELMAN SYNDROME RESULT TEXT: NORMAL
NTRA ANGELMAN SYNDROME RISK SCORE TEXT: NORMAL
NTRA BETA-HEMOGLOBINOPATHIES: NEGATIVE
NTRA CANAVAN DISEASE: NEGATIVE
NTRA CRI-DU-CHAT SYNDROME POPULATION-BASED RISK TEXT: NORMAL
NTRA CRI-DU-CHAT SYNDROME RESULT TEXT: NORMAL
NTRA CRI-DU-CHAT SYNDROME RISK SCORE TEXT: NORMAL
NTRA CYSTIC FIBROSIS: NEGATIVE
NTRA DUCHENNE/BECKER MUSCULAR DYSTROPHY: NEGATIVE
NTRA FAMILIAL DYSAUTONOMIA: NEGATIVE
NTRA FETAL FRACTION: NORMAL
NTRA FETAL RHD SUMMARY: NORMAL
NTRA FRAGILE X SYNDROME: NEGATIVE
NTRA GALACTOSEMIA: NEGATIVE
NTRA GAUCHER DISEASE: NEGATIVE
NTRA GENDER OF FETUS: NORMAL
NTRA MEDIUM CHAIN ACYL-COA DEHYDROGENASE DEFICIENCY: NEGATIVE
NTRA MONOSOMY X AGE-BASED RISK TEXT: NORMAL
NTRA MONOSOMY X RESULT TEXT: NORMAL
NTRA MONOSOMY X RISK SCORE TEXT: NORMAL
NTRA POLYCYSTIC KIDNEY DISEASE, AUTOSOMAL RECESSIVE: NEGATIVE
NTRA PRADER-WILLI SYNDROME POPULATION-BASED RISK TEXT: NORMAL
NTRA PRADER-WILLI SYNDROME RESULT TEXT: NORMAL
NTRA PRADER-WILLI SYNDROME RISK SCORE TEXT: NORMAL
NTRA SMITH-LEMLI-OPITZ SYNDROME: NEGATIVE
NTRA SPINAL MUSCULAR ATROPHY: NEGATIVE
NTRA TAY-SACHS DISEASE: NEGATIVE
NTRA TRIPLOIDY RESULT TEXT: NORMAL
NTRA TRISOMY 13 AGE-BASED RISK TEXT: NORMAL
NTRA TRISOMY 13 RESULT TEXT: NORMAL
NTRA TRISOMY 13 RISK SCORE TEXT: NORMAL
NTRA TRISOMY 18 AGE-BASED RISK TEXT: NORMAL
NTRA TRISOMY 18 RESULT TEXT: NORMAL
NTRA TRISOMY 18 RISK SCORE TEXT: NORMAL
NTRA TRISOMY 21 AGE-BASED RISK TEXT: NORMAL
NTRA TRISOMY 21 RESULT TEXT: NORMAL
NTRA TRISOMY 21 RISK SCORE TEXT: NORMAL

## 2025-02-04 PROCEDURE — 76813 OB US NUCHAL MEAS 1 GEST: CPT | Performed by: OBSTETRICS & GYNECOLOGY

## 2025-02-04 PROCEDURE — 76801 OB US < 14 WKS SINGLE FETUS: CPT | Performed by: OBSTETRICS & GYNECOLOGY

## 2025-02-04 PROCEDURE — 99213 OFFICE O/P EST LOW 20 MIN: CPT | Performed by: OBSTETRICS & GYNECOLOGY

## 2025-02-04 NOTE — PROGRESS NOTES
[facility]ERNAL [facility]ETAL [facility]  [address]  [place] [address]  Dept: [phone]     2025    RE:  Aleksandra Vanegas     : 1980   AGE: 44 y.o.     Dear Dr. Kc,    Thank you for allowing me to provide prenatal consultation for Aleksandra Vanegas.  As I'm sure you will recall, Aleksandra Vanegas is a 44 y.o. [id number]'s last menstrual period was 2024 (exact date). Estimated Date of Delivery: 8/15/25 at 12w4d seen in our office today for the following:    REASON FOR CONSULTATION:  Patient Active Problem List    Diagnosis Date Noted    Sixth pregnancy 2025    Hx of  section 2025    History of DVT of lower extremity 2025    History of gestational diabetes in prior pregnancy, currently pregnant 2025    H/O gastric bypass 2025    Hx of intestinal obstruction 2025    History of myomectomy 2025    History of pulmonary embolism 2025    Pregnancy with history of  section, antepartum 2025    Advanced maternal age in multigravida, first trimester 2025    Intramural leiomyoma of uterus 2025    Uterine fibroid complicating  care, baby not yet delivered 2025    Antiphospholipid syndrome (HCC) 2021    Endometrial hyperplasia without atypia 2021    Submucous uterine fibroid 2021    Anemia 2021    s/p Hscope D&C, myosure 2021    Menorrhagia 2021    History of cerebrovascular accident (CVA) due to ischemia 2021    Nuchal Translucency    PAST HISTORY:  OB History    Para Term  AB Living   6 2 2   3 2   SAB IAB Ectopic Molar Multiple Live Births   3         2      # Outcome Date GA Lbr Vic/2nd Weight Sex Type Anes PTL Lv   6 Current            5 2022           4 2019           3 2017           2 Term 2009    M CS-LTranv EPI N JOSE LUIS      Birth Comments: malpresentation per

## 2025-02-20 ENCOUNTER — ROUTINE PRENATAL (OUTPATIENT)
Dept: OBGYN CLINIC | Age: 45
End: 2025-02-20
Payer: COMMERCIAL

## 2025-02-20 VITALS — DIASTOLIC BLOOD PRESSURE: 72 MMHG | BODY MASS INDEX: 26.95 KG/M2 | WEIGHT: 167 LBS | SYSTOLIC BLOOD PRESSURE: 110 MMHG

## 2025-02-20 DIAGNOSIS — Z86.711 HISTORY OF PULMONARY EMBOLISM: ICD-10-CM

## 2025-02-20 DIAGNOSIS — Z98.84 H/O GASTRIC BYPASS: ICD-10-CM

## 2025-02-20 DIAGNOSIS — Z3A.14 14 WEEKS GESTATION OF PREGNANCY: Primary | ICD-10-CM

## 2025-02-20 DIAGNOSIS — Z86.718 HISTORY OF DVT OF LOWER EXTREMITY: ICD-10-CM

## 2025-02-20 DIAGNOSIS — O34.219 PREGNANCY WITH HISTORY OF CESAREAN SECTION, ANTEPARTUM: ICD-10-CM

## 2025-02-20 DIAGNOSIS — Z86.73 HISTORY OF ISCHEMIC STROKE: ICD-10-CM

## 2025-02-20 DIAGNOSIS — O34.10 UTERINE FIBROID IN PREGNANCY: ICD-10-CM

## 2025-02-20 DIAGNOSIS — D25.9 UTERINE FIBROID IN PREGNANCY: ICD-10-CM

## 2025-02-20 PROCEDURE — 99214 OFFICE O/P EST MOD 30 MIN: CPT

## 2025-02-20 RX ORDER — FOLIC ACID 1 MG/1
1 TABLET ORAL DAILY
Qty: 30 TABLET | Refills: 3 | Status: SHIPPED | OUTPATIENT
Start: 2025-02-20

## 2025-02-20 NOTE — PROGRESS NOTES
Rhogam - not indicated, b positive blood type    Educated on the importance of vaccines (TDAP, Flu, Covid) in pregnancy.  The American College of Obstetricians and Gynecologists (ACOG) and the Society for Maternal-Fetal Medicine (SMFM), the two leading organizations representing specialists in obstetric care, recommend that all pregnant individuals be vaccinated against COVID-19. The organizations’ recommendations in support of vaccination during pregnancy reflect evidence demonstrating the safe use of the COVID-19 vaccines during pregnancy from tens of thousands of reporting individuals. Data have shown that COVID-19 infection puts pregnant people at increased risk of severe complications and even death. R/B/A discussed with increased risk of both maternal and fetal morbidity and mortality in unvaccinated pregnant patients who contract COVID-19.     Educated on  labor, signs and symptoms of preeclampsia. Notify office of any decreased movement, vaginal bleeding, loss of fluid or contractions.     EDC: Estimated Date of Delivery: 8/15/25, Planning for REPEAT CS- unsure about rrbs yet     RTO 4 weeks or sooner PRN.    The patient was seen face to face and examined today by LUIS ANTONIO Leonard CNP.    On this date I have spent 30 minutes   -preparing to see the patient by reviewing the electronic medical record as well as  -obtaining and/or reviewing separately obtained history,   -performing a medically appropriate examination and/or evaluation,   -ordering medication, tests or procedures and   -counseling and educating the patient/family/caregiver,   -referring and communicating with other health care professionals,   -documenting clinical information in the electronic or other health records,   -independently interpreting results and communicating results to the patient family caregiver  -care coordination

## 2025-03-08 DIAGNOSIS — B37.9 YEAST INFECTION: Primary | ICD-10-CM

## 2025-03-08 DIAGNOSIS — Z3A.17 17 WEEKS GESTATION OF PREGNANCY: ICD-10-CM

## 2025-03-08 RX ORDER — FLUCONAZOLE 150 MG/1
150 TABLET ORAL ONCE
Qty: 1 TABLET | Refills: 0 | Status: SHIPPED | OUTPATIENT
Start: 2025-03-08 | End: 2025-03-08

## 2025-03-20 ENCOUNTER — HOSPITAL ENCOUNTER (OUTPATIENT)
Age: 45
Setting detail: SPECIMEN
Discharge: HOME | End: 2025-03-20

## 2025-03-20 ENCOUNTER — ROUTINE PRENATAL (OUTPATIENT)
Dept: OBGYN CLINIC | Age: 45
End: 2025-03-20

## 2025-03-20 VITALS
WEIGHT: 174.6 LBS | SYSTOLIC BLOOD PRESSURE: 102 MMHG | DIASTOLIC BLOOD PRESSURE: 78 MMHG | HEIGHT: 66 IN | BODY MASS INDEX: 28.06 KG/M2

## 2025-03-20 DIAGNOSIS — Z98.890 HISTORY OF MYOMECTOMY: ICD-10-CM

## 2025-03-20 DIAGNOSIS — Z86.711 HISTORY OF PULMONARY EMBOLISM: ICD-10-CM

## 2025-03-20 DIAGNOSIS — Z86.718 HISTORY OF DVT OF LOWER EXTREMITY: ICD-10-CM

## 2025-03-20 DIAGNOSIS — O34.10 UTERINE FIBROID COMPLICATING ANTENATAL CARE, BABY NOT YET DELIVERED: ICD-10-CM

## 2025-03-20 DIAGNOSIS — Z86.73 HISTORY OF CEREBROVASCULAR ACCIDENT (CVA) DUE TO ISCHEMIA: ICD-10-CM

## 2025-03-20 DIAGNOSIS — Z87.19 HX OF INTESTINAL OBSTRUCTION: ICD-10-CM

## 2025-03-20 DIAGNOSIS — O10.919 CHRONIC HYPERTENSION AFFECTING PREGNANCY: ICD-10-CM

## 2025-03-20 DIAGNOSIS — D68.61 ANTIPHOSPHOLIPID SYNDROME: ICD-10-CM

## 2025-03-20 DIAGNOSIS — O09.529 ANTEPARTUM MULTIGRAVIDA OF ADVANCED MATERNAL AGE: ICD-10-CM

## 2025-03-20 DIAGNOSIS — D25.9 UTERINE FIBROID COMPLICATING ANTENATAL CARE, BABY NOT YET DELIVERED: ICD-10-CM

## 2025-03-20 DIAGNOSIS — O34.219 PREGNANCY WITH HISTORY OF CESAREAN SECTION, ANTEPARTUM: Primary | ICD-10-CM

## 2025-03-20 DIAGNOSIS — N89.8 VAGINAL DISCHARGE: ICD-10-CM

## 2025-03-20 DIAGNOSIS — Z98.84 H/O GASTRIC BYPASS: ICD-10-CM

## 2025-03-20 DIAGNOSIS — Z86.32 HISTORY OF GESTATIONAL DIABETES IN PRIOR PREGNANCY, CURRENTLY PREGNANT: ICD-10-CM

## 2025-03-20 DIAGNOSIS — O09.299 HISTORY OF GESTATIONAL DIABETES IN PRIOR PREGNANCY, CURRENTLY PREGNANT: ICD-10-CM

## 2025-03-20 PROBLEM — O09.521 ADVANCED MATERNAL AGE IN MULTIGRAVIDA, FIRST TRIMESTER: Status: RESOLVED | Noted: 2025-01-23 | Resolved: 2025-03-20

## 2025-03-20 PROBLEM — D25.0 SUBMUCOUS UTERINE FIBROID: Status: RESOLVED | Noted: 2021-07-17 | Resolved: 2025-03-20

## 2025-03-20 RX ORDER — ASPIRIN 81 MG/1
81 TABLET ORAL DAILY
Qty: 90 TABLET | Refills: 1 | Status: SHIPPED | OUTPATIENT
Start: 2025-03-20

## 2025-03-20 NOTE — PROGRESS NOTES
said these were nonspecific elevations and has never truly diagnosed with antiphospholipid antibody syndrome.  She is currently on Lovenox 1 mg/kg daily due to her history of a CVA, DVT and pulmonary embolism.          Patient Active Problem List    Diagnosis Date Noted    Antiphospholipid syndrome 2021     Priority: High     Lovenox 1mg/kg BID     testing at 28 weeks      AMA 2025    cHTN (no meds) 2025    Sixth pregnancy 2025    Hx of  section 2025    History of DVT of lower extremity 2025    History of gestational diabetes in prior pregnancy, currently pregnant 2025    H/O gastric bypass 2025    Hx of intestinal obstruction 2025    History of myomectomy 2025    History of pulmonary embolism 2025    Pregnancy with history of  section, antepartum 2025    Intramural leiomyoma of uterus 2025    Uterine fibroid complicating  care, baby not yet delivered 2025     Fundal 5 x 7 x 5 cm      Endometrial hyperplasia without atypia 2021    Anemia 2021    Menorrhagia 2021    History of cerebrovascular accident (CVA) due to ischemia 2021     Return in about 4 weeks (around 2025) for DO Zelda Campos Ob/Gyn   3/20/2025, 2:24 PM

## 2025-03-21 ENCOUNTER — RESULTS FOLLOW-UP (OUTPATIENT)
Dept: OBGYN CLINIC | Age: 45
End: 2025-03-21

## 2025-03-21 DIAGNOSIS — N89.8 VAGINAL DISCHARGE: Primary | ICD-10-CM

## 2025-03-21 DIAGNOSIS — N76.0 BACTERIAL VAGINITIS: Primary | ICD-10-CM

## 2025-03-21 DIAGNOSIS — B96.89 BACTERIAL VAGINITIS: Primary | ICD-10-CM

## 2025-03-21 LAB
CANDIDA SPECIES: NEGATIVE
GARDNERELLA VAGINALIS: POSITIVE
SOURCE: ABNORMAL
TRICHOMONAS: NEGATIVE

## 2025-03-21 RX ORDER — METRONIDAZOLE 500 MG/1
500 TABLET ORAL 2 TIMES DAILY
Qty: 14 TABLET | Refills: 0 | Status: SHIPPED | OUTPATIENT
Start: 2025-03-21 | End: 2025-03-28

## 2025-03-21 RX ORDER — FLUCONAZOLE 150 MG/1
150 TABLET ORAL
Qty: 2 TABLET | Refills: 0 | Status: SHIPPED | OUTPATIENT
Start: 2025-03-21

## 2025-04-01 ENCOUNTER — ROUTINE PRENATAL (OUTPATIENT)
Dept: PERINATAL CARE | Age: 45
End: 2025-04-01
Payer: COMMERCIAL

## 2025-04-01 VITALS
WEIGHT: 179 LBS | HEIGHT: 66 IN | TEMPERATURE: 97.5 F | DIASTOLIC BLOOD PRESSURE: 74 MMHG | BODY MASS INDEX: 28.77 KG/M2 | HEART RATE: 93 BPM | RESPIRATION RATE: 16 BRPM | SYSTOLIC BLOOD PRESSURE: 110 MMHG

## 2025-04-01 DIAGNOSIS — Z34.90 SIXTH PREGNANCY: ICD-10-CM

## 2025-04-01 DIAGNOSIS — O09.521 ADVANCED MATERNAL AGE IN MULTIGRAVIDA, FIRST TRIMESTER: ICD-10-CM

## 2025-04-01 DIAGNOSIS — Z86.711 HISTORY OF PULMONARY EMBOLISM: ICD-10-CM

## 2025-04-01 DIAGNOSIS — Z98.84 H/O GASTRIC BYPASS: ICD-10-CM

## 2025-04-01 DIAGNOSIS — Z86.718 HISTORY OF DVT OF LOWER EXTREMITY: ICD-10-CM

## 2025-04-01 DIAGNOSIS — Z3A.20 20 WEEKS GESTATION OF PREGNANCY: Primary | ICD-10-CM

## 2025-04-01 DIAGNOSIS — O34.219 PREGNANCY WITH HISTORY OF CESAREAN SECTION, ANTEPARTUM: ICD-10-CM

## 2025-04-01 DIAGNOSIS — D25.0 SUBMUCOUS UTERINE FIBROID: ICD-10-CM

## 2025-04-01 DIAGNOSIS — Z86.73 HISTORY OF CEREBROVASCULAR ACCIDENT (CVA) DUE TO ISCHEMIA: ICD-10-CM

## 2025-04-01 DIAGNOSIS — Z98.891 HX OF CESAREAN SECTION: ICD-10-CM

## 2025-04-01 PROCEDURE — 76811 OB US DETAILED SNGL FETUS: CPT | Performed by: OBSTETRICS & GYNECOLOGY

## 2025-04-01 PROCEDURE — 99999 PR OFFICE/OUTPT VISIT,PROCEDURE ONLY: CPT | Performed by: OBSTETRICS & GYNECOLOGY

## 2025-04-01 RX ORDER — SYRINGE WITH NEEDLE, 1 ML 25GX5/8"
SYRINGE, EMPTY DISPOSABLE MISCELLANEOUS
COMMUNITY
Start: 2025-03-24

## 2025-04-01 RX ORDER — FERROUS SULFATE 325(65) MG
325 TABLET ORAL
COMMUNITY

## 2025-04-01 NOTE — PROGRESS NOTES
[facility] MATERNAL FETAL MED  [address]  [place] [facility] [place] [address]  Dept: [phone]     2025    RE:  Aleksandra Vanegas     : 1980   AGE: 44 y.o.     Dear Dr. Birmingham,    Thank you for allowing me to see Aleksandra Vanegas.  As I'm sure you will recall, Aleksandra Vanegas is a 44 y.o. [id number]'s last menstrual period was 2024 (exact date). Estimated Date of Delivery: 8/15/25 at 20w4d seen in our office today for the following:    REASON FOR VISIT: Level II    Patient Active Problem List    Diagnosis Date Noted    20 weeks gestation of pregnancy 2025    AMA 2025    cHTN (no meds) 2025    Sixth pregnancy 2025    Hx of  section 2025    History of DVT of lower extremity 2025    History of gestational diabetes in prior pregnancy, currently pregnant 2025    H/O gastric bypass 2025    Hx of intestinal obstruction 2025    History of myomectomy 2025    History of pulmonary embolism 2025    Pregnancy with history of  section, antepartum 2025    Intramural leiomyoma of uterus 2025    Uterine fibroid complicating  care, baby not yet delivered 2025    Questionable Antiphospholipid syndrome 2021    Endometrial hyperplasia without atypia 2021    Anemia 2021    Menorrhagia 2021    History of cerebrovascular accident (CVA) due to ischemia 2021        PAST HISTORY:  OB History    Para Term  AB Living   6 2 2  3 2   SAB IAB Ectopic Molar Multiple Live Births   3     2      # Outcome Date GA Lbr Vic/2nd Weight Sex Type Anes PTL Lv   6 Current            5 2022           4 2019           3 2017           2 Term 2009    M CS-LTranv EPI N JOSE LUIS      Birth Comments: malpresentation per pt   1 Term  38w0d   F Vag-Spont EPI N JOSE LUIS      Birth Comments: GDMA-2          MEDICAL:  Past

## 2025-04-16 ENCOUNTER — ROUTINE PRENATAL (OUTPATIENT)
Dept: OBGYN CLINIC | Age: 45
End: 2025-04-16
Payer: COMMERCIAL

## 2025-04-16 VITALS
DIASTOLIC BLOOD PRESSURE: 70 MMHG | BODY MASS INDEX: 29.21 KG/M2 | HEART RATE: 99 BPM | SYSTOLIC BLOOD PRESSURE: 114 MMHG | WEIGHT: 181 LBS

## 2025-04-16 DIAGNOSIS — O34.219 PREGNANCY WITH HISTORY OF CESAREAN SECTION, ANTEPARTUM: ICD-10-CM

## 2025-04-16 DIAGNOSIS — Z34.82 PRENATAL CARE, SUBSEQUENT PREGNANCY IN SECOND TRIMESTER: ICD-10-CM

## 2025-04-16 DIAGNOSIS — Z3A.22 22 WEEKS GESTATION OF PREGNANCY: Primary | ICD-10-CM

## 2025-04-16 DIAGNOSIS — Z98.84 H/O GASTRIC BYPASS: ICD-10-CM

## 2025-04-16 DIAGNOSIS — O10.919 CHRONIC HYPERTENSION AFFECTING PREGNANCY: ICD-10-CM

## 2025-04-16 DIAGNOSIS — Z98.890 HISTORY OF MYOMECTOMY: ICD-10-CM

## 2025-04-16 DIAGNOSIS — O09.529 ANTEPARTUM MULTIGRAVIDA OF ADVANCED MATERNAL AGE: ICD-10-CM

## 2025-04-16 PROBLEM — Z98.891 HX OF CESAREAN SECTION: Status: RESOLVED | Noted: 2025-02-04 | Resolved: 2025-04-16

## 2025-04-16 PROCEDURE — 99213 OFFICE O/P EST LOW 20 MIN: CPT | Performed by: STUDENT IN AN ORGANIZED HEALTH CARE EDUCATION/TRAINING PROGRAM

## 2025-04-16 RX ORDER — ATORVASTATIN CALCIUM 40 MG/1
TABLET, FILM COATED ORAL
COMMUNITY
Start: 2025-04-05

## 2025-04-16 RX ORDER — AVOBENZONE, HOMOSALATE, OCTISALATE, OCTOCRYLENE 30; 40; 45; 26 MG/ML; MG/ML; MG/ML; MG/ML
1 CREAM TOPICAL 4 TIMES DAILY
Qty: 600 EACH | Refills: 1 | Status: SHIPPED | OUTPATIENT
Start: 2025-04-16

## 2025-04-16 RX ORDER — BLOOD-GLUCOSE METER
1 KIT MISCELLANEOUS DAILY
Qty: 1 KIT | Refills: 0 | Status: SHIPPED | OUTPATIENT
Start: 2025-04-16

## 2025-04-16 RX ORDER — GLUCOSAMINE HCL/CHONDROITIN SU 500-400 MG
CAPSULE ORAL
Qty: 600 STRIP | Refills: 1 | Status: SHIPPED | OUTPATIENT
Start: 2025-04-16

## 2025-04-16 RX ORDER — METRONIDAZOLE 500 MG/1
500 TABLET ORAL 2 TIMES DAILY
Qty: 14 TABLET | Refills: 0 | Status: SHIPPED | OUTPATIENT
Start: 2025-04-16 | End: 2025-04-23

## 2025-04-16 RX ORDER — FLUCONAZOLE 150 MG/1
150 TABLET ORAL ONCE
Qty: 1 TABLET | Refills: 0 | Status: SHIPPED | OUTPATIENT
Start: 2025-04-16 | End: 2025-04-16

## 2025-04-16 RX ORDER — UBIQUINOL 100 MG
1 CAPSULE ORAL 4 TIMES DAILY
Qty: 600 EACH | Refills: 1 | Status: SHIPPED | OUTPATIENT
Start: 2025-04-16

## 2025-04-16 NOTE — PROGRESS NOTES
Prenatal Visit    Aleksandra Vanegas is a 44 y.o. female  at 22w5d IUP    The patient was seen and evaluated. She has no complaints today.  Reports positive fetal movements. She denies headache, vision changes, RUQ pain, contractions, vaginal bleeding and leakage of fluid.     The problem list reflects the active issues addressed during today's visit    Vitals:  BP: 114/70  Weight - Scale: 82.1 kg (181 lb)  Pulse: 99  Patient Position: Sitting  Fetal HR: 170  Movement: Present     PHYSICAL:   General appearance: no apparent distress, alert and cooperative  HEENT: head atraumatic, normocephalic, trachea midline, moist mucous membranes   Neurologic: alert, oriented, normal speech   Lungs: no increased work of breathing,   Abdomen: soft, gravid, non-tender on palpation    Musculoskeletal: no gross abnormalities, range of motion appropriate for age   Psychiatric: mood appropriate, normal affect      Assessment & Plan:  Aleksandra Vanegas is a 44 y.o. female  at 22w5d IUP   - VSS    - 28 week labs ordered   - NIPT reviewed low risk   - The patients anatomy ultrasound has been completed and reviewed with patient.    - Following with MFM   - Continue taking prenatal vitamins QD    - Continue taking baby aspirin 81 mg QD    - Rhogam not indicated  - Discussed with patient today plan for repeat .  Problem list updated.  Also discussed fibroid.  Discussed Lovenox, patient is using that.  Patient will be doing glucometer with glucose checks for 2 weeks due to history of gastric bypass.    Return in about 4 weeks (around 2025) for Routine OB.        Orders Placed This Encounter   Procedures    Culture, Urine     Standing Status:   Future     Expected Date:   2025     Expiration Date:   4/15/2026     Specify (ex-cath, midstream, cysto, etc)?:   cc    CBC with Auto Differential     Standing Status:   Future     Expected Date:   2025     Expiration Date:   4/15/2026    T. pallidum Ab     Standing

## 2025-04-30 ENCOUNTER — ROUTINE PRENATAL (OUTPATIENT)
Age: 45
End: 2025-04-30
Payer: COMMERCIAL

## 2025-04-30 VITALS
WEIGHT: 181 LBS | HEIGHT: 66 IN | TEMPERATURE: 98 F | DIASTOLIC BLOOD PRESSURE: 63 MMHG | SYSTOLIC BLOOD PRESSURE: 111 MMHG | BODY MASS INDEX: 29.09 KG/M2 | RESPIRATION RATE: 16 BRPM | HEART RATE: 102 BPM

## 2025-04-30 DIAGNOSIS — Z3A.24 24 WEEKS GESTATION OF PREGNANCY: Primary | ICD-10-CM

## 2025-04-30 DIAGNOSIS — Z98.891 HX OF CESAREAN SECTION: ICD-10-CM

## 2025-04-30 DIAGNOSIS — Z86.32 HISTORY OF GESTATIONAL DIABETES IN PRIOR PREGNANCY, CURRENTLY PREGNANT: ICD-10-CM

## 2025-04-30 DIAGNOSIS — Z34.90 SIXTH PREGNANCY: ICD-10-CM

## 2025-04-30 DIAGNOSIS — O34.219 PREGNANCY WITH HISTORY OF CESAREAN SECTION, ANTEPARTUM: ICD-10-CM

## 2025-04-30 DIAGNOSIS — Z86.718 HISTORY OF DVT OF LOWER EXTREMITY: ICD-10-CM

## 2025-04-30 DIAGNOSIS — O10.919 CHRONIC HYPERTENSION AFFECTING PREGNANCY: ICD-10-CM

## 2025-04-30 DIAGNOSIS — Z86.73 HISTORY OF CEREBROVASCULAR ACCIDENT (CVA) DUE TO ISCHEMIA: ICD-10-CM

## 2025-04-30 DIAGNOSIS — O09.529 ANTEPARTUM MULTIGRAVIDA OF ADVANCED MATERNAL AGE: ICD-10-CM

## 2025-04-30 DIAGNOSIS — Z98.84 H/O GASTRIC BYPASS: ICD-10-CM

## 2025-04-30 DIAGNOSIS — O09.299 HISTORY OF GESTATIONAL DIABETES IN PRIOR PREGNANCY, CURRENTLY PREGNANT: ICD-10-CM

## 2025-04-30 DIAGNOSIS — Z86.711 HISTORY OF PULMONARY EMBOLISM: ICD-10-CM

## 2025-04-30 PROCEDURE — 76816 OB US FOLLOW-UP PER FETUS: CPT | Performed by: OBSTETRICS & GYNECOLOGY

## 2025-05-14 ENCOUNTER — ROUTINE PRENATAL (OUTPATIENT)
Dept: OBGYN CLINIC | Age: 45
End: 2025-05-14
Payer: COMMERCIAL

## 2025-05-14 ENCOUNTER — HOSPITAL ENCOUNTER (OUTPATIENT)
Age: 45
Setting detail: SPECIMEN
Discharge: HOME | End: 2025-05-14

## 2025-05-14 VITALS
HEART RATE: 115 BPM | DIASTOLIC BLOOD PRESSURE: 80 MMHG | BODY MASS INDEX: 29.21 KG/M2 | SYSTOLIC BLOOD PRESSURE: 103 MMHG | WEIGHT: 181 LBS

## 2025-05-14 DIAGNOSIS — Z3A.26 26 WEEKS GESTATION OF PREGNANCY: Primary | ICD-10-CM

## 2025-05-14 DIAGNOSIS — Z3A.22 22 WEEKS GESTATION OF PREGNANCY: ICD-10-CM

## 2025-05-14 DIAGNOSIS — Z34.82 PRENATAL CARE, SUBSEQUENT PREGNANCY IN SECOND TRIMESTER: ICD-10-CM

## 2025-05-14 LAB
BASOPHILS # BLD: <0.03 K/UL (ref 0–0.2)
BASOPHILS NFR BLD: 0 % (ref 0–2)
EOSINOPHIL # BLD: 0.06 K/UL (ref 0–0.44)
EOSINOPHILS RELATIVE PERCENT: 1 % (ref 1–4)
ERYTHROCYTE [DISTWIDTH] IN BLOOD BY AUTOMATED COUNT: 13.8 % (ref 11.8–14.4)
HCT VFR BLD AUTO: 36.6 % (ref 36.3–47.1)
HGB BLD-MCNC: 12.3 G/DL (ref 11.9–15.1)
IMM GRANULOCYTES # BLD AUTO: 0.08 K/UL (ref 0–0.3)
IMM GRANULOCYTES NFR BLD: 1 %
LYMPHOCYTES NFR BLD: 1.56 K/UL (ref 1.1–3.7)
LYMPHOCYTES RELATIVE PERCENT: 14 % (ref 24–43)
MCH RBC QN AUTO: 32.4 PG (ref 25.2–33.5)
MCHC RBC AUTO-ENTMCNC: 33.6 G/DL (ref 28.4–34.8)
MCV RBC AUTO: 96.3 FL (ref 82.6–102.9)
MONOCYTES NFR BLD: 0.97 K/UL (ref 0.1–1.2)
MONOCYTES NFR BLD: 9 % (ref 3–12)
NEUTROPHILS NFR BLD: 75 % (ref 36–65)
NEUTS SEG NFR BLD: 8.45 K/UL (ref 1.5–8.1)
NRBC BLD-RTO: 0 PER 100 WBC
PLATELET # BLD AUTO: 266 K/UL (ref 138–453)
PMV BLD AUTO: 10.6 FL (ref 8.1–13.5)
RBC # BLD AUTO: 3.8 M/UL (ref 3.95–5.11)
T PALLIDUM AB SER QL IA: NONREACTIVE
WBC OTHER # BLD: 11.1 K/UL (ref 3.5–11.3)

## 2025-05-14 PROCEDURE — 99213 OFFICE O/P EST LOW 20 MIN: CPT | Performed by: STUDENT IN AN ORGANIZED HEALTH CARE EDUCATION/TRAINING PROGRAM

## 2025-05-14 NOTE — PROGRESS NOTES
Prenatal Visit    Aleksandra Vanegas is a 44 y.o. female  at 26w5d IUP    The patient was seen and evaluated. She has no complaints today.  Reports positive fetal movements. She denies headache, vision changes, RUQ pain, contractions, vaginal bleeding and leakage of fluid.     The problem list reflects the active issues addressed during today's visit    Vitals:  BP: 103/80  Weight - Scale: 82.1 kg (181 lb)  Pulse: (!) 115  Patient Position: Sitting  Fundal Height (cm): 26 cm  Fetal HR: 150  Movement: Present     PHYSICAL:   General appearance: no apparent distress, alert and cooperative  HEENT: head atraumatic, normocephalic, trachea midline, moist mucous membranes   Neurologic: alert, oriented, normal speech   Lungs: no increased work of breathing,   Abdomen: soft, gravid, non-tender on palpation    Musculoskeletal: no gross abnormalities, range of motion appropriate for age   Psychiatric: mood appropriate, normal affect      Assessment & Plan:  Aleksandra Vanegas is a 44 y.o. female  at 26w5d IUP   - VSS =   - 28 week labs ordered   - NIPT reviewed low risk   - AFP not done   - The patients anatomy ultrasound has been completed and reviewed with patient.    - Next [facility] appointment 2025   - Continue taking prenatal vitamins QD    - Continue taking baby aspirin 81 mg QD    - Rhogam not indicated   - Patient had contact with person with malaria and Hep A but no direct contact with mosquito or infective material    Return in about 4 weeks (around 2025) for BRIE.        No orders of the defined types were placed in this encounter.    Patient Active Problem List    Diagnosis Date Noted    24 weeks gestation of pregnancy 2025    AMA 2025     NST/BPP @ 35 wks per MFM   Del @ 39 wks per M      cHTN (no meds) 2025    Sixth pregnancy 2025    History of DVT of lower extremity 2025    History of gestational diabetes in prior pregnancy, currently pregnant 2025    H/O gastric

## 2025-05-15 ENCOUNTER — RESULTS FOLLOW-UP (OUTPATIENT)
Dept: OBGYN CLINIC | Age: 45
End: 2025-05-15

## 2025-05-27 ENCOUNTER — HOSPITAL ENCOUNTER (OUTPATIENT)
Age: 45
Setting detail: SPECIMEN
Discharge: HOME | End: 2025-05-27

## 2025-05-27 ENCOUNTER — ROUTINE PRENATAL (OUTPATIENT)
Dept: OBGYN CLINIC | Age: 45
End: 2025-05-27
Payer: COMMERCIAL

## 2025-05-27 VITALS
BODY MASS INDEX: 29.6 KG/M2 | DIASTOLIC BLOOD PRESSURE: 75 MMHG | WEIGHT: 183.4 LBS | SYSTOLIC BLOOD PRESSURE: 116 MMHG | HEART RATE: 107 BPM

## 2025-05-27 DIAGNOSIS — Z3A.22 22 WEEKS GESTATION OF PREGNANCY: ICD-10-CM

## 2025-05-27 DIAGNOSIS — Z3A.28 28 WEEKS GESTATION OF PREGNANCY: Primary | ICD-10-CM

## 2025-05-27 DIAGNOSIS — Z34.83 PRENATAL CARE, SUBSEQUENT PREGNANCY IN THIRD TRIMESTER: ICD-10-CM

## 2025-05-27 DIAGNOSIS — Z23 NEED FOR TDAP VACCINATION: ICD-10-CM

## 2025-05-27 PROCEDURE — 99213 OFFICE O/P EST LOW 20 MIN: CPT | Performed by: STUDENT IN AN ORGANIZED HEALTH CARE EDUCATION/TRAINING PROGRAM

## 2025-05-27 PROCEDURE — 90715 TDAP VACCINE 7 YRS/> IM: CPT | Performed by: STUDENT IN AN ORGANIZED HEALTH CARE EDUCATION/TRAINING PROGRAM

## 2025-05-27 PROCEDURE — 90471 IMMUNIZATION ADMIN: CPT | Performed by: STUDENT IN AN ORGANIZED HEALTH CARE EDUCATION/TRAINING PROGRAM

## 2025-05-27 NOTE — PROGRESS NOTES
Prenatal Visit    Aleksandra Vanegas is a 44 y.o. female  at 28w4d IUP    The patient was seen and evaluated. She has no complaints today. She reports positive fetal movements. She denies contractions, vaginal bleeding and leakage of fluid. Signs and symptoms of labor and pre-eclampsia were reviewed with the patient in detail. She is to report any of these if they occur. She currently denies any of these.    The problem list reflects the active issues addressed during today's visit    Vitals:  BP: 116/75  Weight - Scale: 83.2 kg (183 lb 6.4 oz)  Pulse: (!) 107  Patient Position: Sitting  Fundal Height (cm): 28 cm  Fetal HR: 155  Movement: Present     PHYSICAL:   General appearance: no apparent distress, alert and cooperative  HEENT: head atraumatic, normocephalic, trachea midline, moist mucous membranes   Neurologic: alert, oriented, normal speech   Lungs: no increased work of breathing,   Abdomen: soft, gravid, non-tender on palpation    Musculoskeletal: no gross abnormalities, range of motion appropriate for age   Psychiatric: mood appropriate, normal affect      Assessment & Plan:  Aleksandra Vanegas is a 44 y.o. female  at 28w4d   - VSS    - 28 week labs completed, patient still checking her blood sugars but forgot her glucometer at home, states they have been in control, no 1 hour glucose test due to gastric bypass   - Continue taking prenatal vitamins QD    - Continue taking baby aspirin 81 mg QD    - Tdap vaccination: Given today   - Rhogam: Not indicated   -  testing indication: Advanced maternal age, chronic hypertension on no meds, NST BPP to start at 35 weeks   - Patient with history of  x 1, desires  to be scheduled with me or Dr. Tripathi.  Currently planning for delivery 2025 at 38 weeks  Return for 2 weeks BRIE.    Counseling:   - Warning signs reviewed and recommendations when to call or present to the hospital if she experiences signs or symptoms of  labor

## 2025-05-27 NOTE — PROGRESS NOTES
After obtaining verbal consent, and per orders of Dr. Vidhya Browne, injection of Tdap 0.5mL given in Right deltoid IM by Almaz Kelley MA. Patient instructed to remain in clinic for 20 minutes afterwards, and to report any adverse reaction to me immediately.    NDC Tdap 17151-995-39   LOT [id number]  EXP 7.11.27    Last seen: 5/14/2025  Next appt: 6/11/2025

## 2025-05-28 ENCOUNTER — ROUTINE PRENATAL (OUTPATIENT)
Age: 45
End: 2025-05-28
Payer: COMMERCIAL

## 2025-05-28 VITALS
RESPIRATION RATE: 16 BRPM | HEIGHT: 66 IN | DIASTOLIC BLOOD PRESSURE: 75 MMHG | TEMPERATURE: 97.2 F | HEART RATE: 109 BPM | SYSTOLIC BLOOD PRESSURE: 108 MMHG | WEIGHT: 186.95 LBS | BODY MASS INDEX: 30.05 KG/M2

## 2025-05-28 DIAGNOSIS — Z34.90 SIXTH PREGNANCY: ICD-10-CM

## 2025-05-28 DIAGNOSIS — Z98.890 HISTORY OF MYOMECTOMY: ICD-10-CM

## 2025-05-28 DIAGNOSIS — O09.529 ANTEPARTUM MULTIGRAVIDA OF ADVANCED MATERNAL AGE: ICD-10-CM

## 2025-05-28 DIAGNOSIS — O34.10 UTERINE FIBROID COMPLICATING ANTENATAL CARE, BABY NOT YET DELIVERED: ICD-10-CM

## 2025-05-28 DIAGNOSIS — Z86.73 HISTORY OF CEREBROVASCULAR ACCIDENT (CVA) DUE TO ISCHEMIA: ICD-10-CM

## 2025-05-28 DIAGNOSIS — D68.61 ANTIPHOSPHOLIPID SYNDROME: ICD-10-CM

## 2025-05-28 DIAGNOSIS — Z86.711 HISTORY OF PULMONARY EMBOLISM: ICD-10-CM

## 2025-05-28 DIAGNOSIS — Z86.718 HISTORY OF DVT OF LOWER EXTREMITY: ICD-10-CM

## 2025-05-28 DIAGNOSIS — Z3A.28 28 WEEKS GESTATION OF PREGNANCY: Primary | ICD-10-CM

## 2025-05-28 DIAGNOSIS — I15.9 SECONDARY HYPERTENSION: ICD-10-CM

## 2025-05-28 DIAGNOSIS — Z98.84 H/O GASTRIC BYPASS: ICD-10-CM

## 2025-05-28 DIAGNOSIS — O34.219 PREGNANCY WITH HISTORY OF CESAREAN SECTION, ANTEPARTUM: ICD-10-CM

## 2025-05-28 DIAGNOSIS — D25.9 UTERINE FIBROID COMPLICATING ANTENATAL CARE, BABY NOT YET DELIVERED: ICD-10-CM

## 2025-05-28 LAB
MICROORGANISM SPEC CULT: NORMAL
SERVICE CMNT-IMP: NORMAL
SPECIMEN DESCRIPTION: NORMAL

## 2025-05-28 PROCEDURE — 76819 FETAL BIOPHYS PROFIL W/O NST: CPT | Performed by: OBSTETRICS & GYNECOLOGY

## 2025-05-28 PROCEDURE — 99999 PR OFFICE/OUTPT VISIT,PROCEDURE ONLY: CPT | Performed by: OBSTETRICS & GYNECOLOGY

## 2025-05-28 PROCEDURE — 76805 OB US >/= 14 WKS SNGL FETUS: CPT | Performed by: OBSTETRICS & GYNECOLOGY

## 2025-05-28 NOTE — PROGRESS NOTES
[facility]ERNAL [facility]ETAL [facility]  [id number]  [place] [address]  Dept: [phone]     2025    RE:  Aleksandra Vanegas     : 1980   AGE: 44 y.o.     Dear Dr. Browne,    Thank you for allowing me to see Aleksandra Vanegas.  As I'm sure you will recall, Aleksandra Vanegas is a 44 y.o. [id number]'s last menstrual period was 2024 (exact date). Estimated Date of Delivery: 8/15/25 at 28w5d seen in our office today for the following:    REASON FOR VISIT: Growth     Patient Active Problem List    Diagnosis Date Noted    28 weeks gestation of pregnancy 2025    AMA 2025    cHTN (no meds) 2025    Sixth pregnancy 2025    History of DVT of lower extremity 2025    History of gestational diabetes in prior pregnancy, currently pregnant 2025    H/O gastric bypass 2025    Hx of intestinal obstruction 2025    History of hysteroscopic myomectomy 2025    History of pulmonary embolism 2025    Hx  x1 2025    Intramural leiomyoma of uterus 2025    Uterine fibroid complicating  care, baby not yet delivered 2025    Questionable Antiphospholipid syndrome 2021    Endometrial hyperplasia without atypia 2021    Anemia 2021    Menorrhagia 2021    History of cerebrovascular accident (CVA) due to ischemia 2021        PAST HISTORY:  OB History    Para Term  AB Living   6 2 2  3 2   SAB IAB Ectopic Molar Multiple Live Births   3     2      # Outcome Date GA Lbr Vic/2nd Weight Sex Type Anes PTL Lv   6 Current            5 2022           4 2019           3 2017           2 Term 2009    M CS-LTranv EPI N JOSE LUIS      Birth Comments: malpresentation per pt   1 Term  38w0d   F Vag-Spont EPI N JOSE LUIS      Birth Comments: GDMA-2          MEDICAL:  Past Medical History:   Diagnosis Date    ADHD     Dr. Zhu

## 2025-06-11 ENCOUNTER — TELEPHONE (OUTPATIENT)
Dept: OBGYN CLINIC | Age: 45
End: 2025-06-11

## 2025-06-11 ENCOUNTER — HOSPITAL ENCOUNTER (OUTPATIENT)
Age: 45
Setting detail: SPECIMEN
Discharge: HOME | End: 2025-06-11

## 2025-06-11 ENCOUNTER — ROUTINE PRENATAL (OUTPATIENT)
Dept: OBGYN CLINIC | Age: 45
End: 2025-06-11
Payer: COMMERCIAL

## 2025-06-11 VITALS
HEART RATE: 84 BPM | WEIGHT: 190 LBS | BODY MASS INDEX: 30.67 KG/M2 | DIASTOLIC BLOOD PRESSURE: 67 MMHG | SYSTOLIC BLOOD PRESSURE: 114 MMHG

## 2025-06-11 DIAGNOSIS — Z3A.30 30 WEEKS GESTATION OF PREGNANCY: Primary | ICD-10-CM

## 2025-06-11 DIAGNOSIS — O09.93 SUPERVISION OF HIGH RISK PREGNANCY IN THIRD TRIMESTER: ICD-10-CM

## 2025-06-11 DIAGNOSIS — N89.8 VAGINAL IRRITATION: ICD-10-CM

## 2025-06-11 LAB
CANDIDA SPECIES: NEGATIVE
GARDNERELLA VAGINALIS: NEGATIVE
SOURCE: NORMAL
TRICHOMONAS: NEGATIVE

## 2025-06-11 PROCEDURE — 99212 OFFICE O/P EST SF 10 MIN: CPT | Performed by: OBSTETRICS & GYNECOLOGY

## 2025-06-11 NOTE — PROGRESS NOTES
at 28 weeks    Per chart view, patient was following with hematology at [facility] and was found to have only mildly elevated lab values on her antiphospholipid antibody workup.  Patient was never truly diagnosed with this.  This note was from September 2022.  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      Endometrial hyperplasia without atypia 08/13/2021    Anemia 07/17/2021    Menorrhagia 06/23/2021    History of cerebrovascular accident (CVA) due to ischemia 05/20/2021       - Tdap vaccination: 5/27/25  - Influenza vaccination: n/a  - Rhogam: n/a  - COVID-19 vaccination:   Advise booster during pregnancy  - RSV vaccination (32-36 weeks): The patient was counseled on benefits to her baby if she receives the Pfizer RSV vaccine (Abrysvo) during pregnancy. She was informed that this vaccination is FDA approved for use during pregnancy n/a    Affirm obtained  Discussed s/sx that should prompt call to the office  Discussed kick counts  Pt counseled to continue PNVs  RTC in 2 wks    Hamida Kc MD

## 2025-06-12 ENCOUNTER — RESULTS FOLLOW-UP (OUTPATIENT)
Dept: OBGYN CLINIC | Age: 45
End: 2025-06-12

## 2025-06-20 ENCOUNTER — ROUTINE PRENATAL (OUTPATIENT)
Dept: OBGYN CLINIC | Age: 45
End: 2025-06-20

## 2025-06-20 VITALS
HEART RATE: 91 BPM | WEIGHT: 190 LBS | SYSTOLIC BLOOD PRESSURE: 111 MMHG | BODY MASS INDEX: 30.67 KG/M2 | DIASTOLIC BLOOD PRESSURE: 76 MMHG

## 2025-06-20 DIAGNOSIS — O34.219 PREGNANCY WITH HISTORY OF CESAREAN SECTION, ANTEPARTUM: ICD-10-CM

## 2025-06-20 DIAGNOSIS — Z3A.32 32 WEEKS GESTATION OF PREGNANCY: ICD-10-CM

## 2025-06-20 DIAGNOSIS — D68.61 ANTIPHOSPHOLIPID SYNDROME: ICD-10-CM

## 2025-06-20 DIAGNOSIS — Z98.84 H/O GASTRIC BYPASS: ICD-10-CM

## 2025-06-20 DIAGNOSIS — O10.919 CHRONIC HYPERTENSION AFFECTING PREGNANCY: ICD-10-CM

## 2025-06-20 DIAGNOSIS — O09.529 ANTEPARTUM MULTIGRAVIDA OF ADVANCED MATERNAL AGE: Primary | ICD-10-CM

## 2025-06-20 DIAGNOSIS — O09.93 SUPERVISION OF HIGH RISK PREGNANCY IN THIRD TRIMESTER: ICD-10-CM

## 2025-06-20 DIAGNOSIS — Z86.718 HISTORY OF DVT OF LOWER EXTREMITY: ICD-10-CM

## 2025-06-20 DIAGNOSIS — Z34.90 SIXTH PREGNANCY: ICD-10-CM

## 2025-06-20 DIAGNOSIS — Z86.73 HISTORY OF CEREBROVASCULAR ACCIDENT (CVA) DUE TO ISCHEMIA: ICD-10-CM

## 2025-06-20 PROBLEM — Z3A.28 28 WEEKS GESTATION OF PREGNANCY: Status: RESOLVED | Noted: 2025-04-01 | Resolved: 2025-06-20

## 2025-06-20 NOTE — PROGRESS NOTES
Prenatal Visit    Aleksandra Vanegas is a 44 y.o. female  at 32w0d    The patient was seen and evaluated. Reports positive fetal movements. She denies headache, vision changes, RUQ pain, contractions, vaginal bleeding and leakage of fluid.       The patient already received the T-Dap Vaccine (27-36 weeks) this pregnancy.       The problem list reflects the active issues addressed during today's visit    Vitals:    BP: 111/76  Weight - Scale: 86.2 kg (190 lb)  Pulse: 91  Fetal HR: RNST  Movement: Present     NST: 20 minute tracing reviewed  Fetal heart rate baseline: 140, moderate variability, accelerations present, decelerations absent    The tracing has been reviewed and is considered reactive.        28 Week Labs:  The patient is RH +, Rhogam not indicated  ABO/Rh   Date Value Ref Range Status   2025 B POSITIVE  Final       1hr GTT: 2 weeks of sugar checks due to Hx gastric bypass   28 week CBC:   Lab Results   Component Value Date    WBC 11.1 2025    HGB 12.3 2025    HCT 36.6 2025    MCV 96.3 2025     2025     UA w/ Ur C&S: neg     Assessment & Plan:  Aleksandra Vanegas is a 44 y.o. female  at 32w0d   - Discussed signs or symptoms of when to call office   - Discussed fetal movement parameters  - 28 week labs completed   - discussed recommendations for TDAP immunization, patient already received TDAP.   - Continue  testing until delivery for APLS.   - Continue taking Prenatal Vitamin.   - Continue Lovenox BID 1mg/kg and ASA.   - BPs controlled on no meds.    Patient Active Problem List    Diagnosis Date Noted    Questionable Antiphospholipid syndrome 2021     Priority: High     Lovenox 1mg/kg BID     testing at 28 weeks    Per chart view, patient was following with hematology at [facility] and was found to have only mildly elevated lab values on her antiphospholipid antibody workup.  Patient was never truly diagnosed with this.   Chronic  -discussed benefits of smoking cessation for 4 minutes

## 2025-06-26 ENCOUNTER — ROUTINE PRENATAL (OUTPATIENT)
Dept: OBGYN CLINIC | Age: 45
End: 2025-06-26
Payer: COMMERCIAL

## 2025-06-26 VITALS
WEIGHT: 191.6 LBS | BODY MASS INDEX: 30.93 KG/M2 | SYSTOLIC BLOOD PRESSURE: 120 MMHG | HEART RATE: 106 BPM | DIASTOLIC BLOOD PRESSURE: 81 MMHG

## 2025-06-26 DIAGNOSIS — O09.529 ANTEPARTUM MULTIGRAVIDA OF ADVANCED MATERNAL AGE: ICD-10-CM

## 2025-06-26 DIAGNOSIS — Z3A.32 32 WEEKS GESTATION OF PREGNANCY: Primary | ICD-10-CM

## 2025-06-26 DIAGNOSIS — O09.93 SUPERVISION OF HIGH RISK PREGNANCY IN THIRD TRIMESTER: ICD-10-CM

## 2025-06-26 DIAGNOSIS — D68.61 ANTIPHOSPHOLIPID SYNDROME: ICD-10-CM

## 2025-06-26 DIAGNOSIS — O10.919 CHRONIC HYPERTENSION AFFECTING PREGNANCY: ICD-10-CM

## 2025-06-26 PROCEDURE — 99213 OFFICE O/P EST LOW 20 MIN: CPT | Performed by: STUDENT IN AN ORGANIZED HEALTH CARE EDUCATION/TRAINING PROGRAM

## 2025-06-26 NOTE — PROGRESS NOTES
Prenatal Visit    Aleksandra Vanegas is a 44 y.o. female  at 32w6d IUP    The patient was seen and evaluated. She has no complaints today. She reports positive fetal movements. She denies contractions, vaginal bleeding and leakage of fluid. Signs and symptoms of labor and pre-eclampsia were reviewed with the patient in detail. She is to report any of these if they occur. She currently denies any of these.    The problem list reflects the active issues addressed during today's visit    Vitals:  BP: 120/81  Weight - Scale: 86.9 kg (191 lb 9.6 oz)  Pulse: (!) 106  Patient Position: Sitting  Movement: Present     PHYSICAL:   General appearance: no apparent distress, alert and cooperative  HEENT: head atraumatic, normocephalic, trachea midline, moist mucous membranes   Neurologic: alert, oriented, normal speech   Lungs: no increased work of breathing,   Abdomen: soft, gravid, non-tender on palpation    Musculoskeletal: no gross abnormalities, range of motion appropriate for age   Psychiatric: mood appropriate, normal affect      NST:  Tracing reviewed for 30 minutes    Baseline: 150 bpm  Variability: moderate  Accelerations: present  Decelerations: absent  TOCO: quiet    Tracing reassuring and reactive      Assessment & Plan:  Aleksandra Vanegas is a 44 y.o. female  at 32w6d   - VSS    - 28 week labs completed,    - Continue taking prenatal vitamins QD    - Continue taking baby aspirin 81 mg QD    - Tdap vaccination: 25   - Rhogam: not indicated   -  testing indication: N/A    - Discussed lovenox dosing. Hematologist moved away. Recs reviewed. OK to cont 120mg qD for now. Reviewed weight and reasons to change.   - Discussed d/c lovenox 24 hours prior to scheduled      Return in about 1 week (around 7/3/2025) for BRIE.    Counseling:   - Warning signs reviewed and recommendations when to call or present to the hospital if she experiences signs or symptoms of  labor and pre-eclampsia

## 2025-06-27 ENCOUNTER — RESULTS FOLLOW-UP (OUTPATIENT)
Dept: OBGYN CLINIC | Age: 45
End: 2025-06-27

## 2025-07-01 PROBLEM — O12.11 PROTEINURIA AFFECTING PREGNANCY IN FIRST TRIMESTER: Status: ACTIVE | Noted: 2025-07-01

## 2025-07-01 NOTE — PROGRESS NOTES
Prenatal Visit    Aleksandra Vanegas is a 44 y.o. female  at 33w5d IUP    The patient was seen and evaluated. She has no complaints today.  She reports positive fetal movements. She denies contractions, vaginal bleeding and leakage of fluid. Signs and symptoms of labor and pre-eclampsia were reviewed with the patient in detail. She is to report any of these if they occur. She currently denies any of these.    The problem list reflects the active issues addressed during today's visit    Vitals:     BP: 111/69  Weight - Scale: 88 kg (194 lb)  Pulse: (!) 110  Patient Position: Sitting  Fetal HR: 140  Movement: Present     PHYSICAL:   General appearance: no apparent distress, alert and cooperative  HEENT: head atraumatic, normocephalic, trachea midline, moist mucous membranes   Neurologic: alert, oriented, normal speech   Lungs: no increased work of breathing,   Abdomen: soft, gravid, non-tender on palpation    Musculoskeletal: no gross abnormalities, range of motion appropriate for age   Psychiatric: mood appropriate, normal affect      NST:   Fetal heart rate baseline: 140, moderate variability, accelerations present, absent decelerations     The tracing has been reviewed and is considered reactive.      Assessment & Plan:  Aleksandra Vanegas is a 44 y.o. female  at 33w5d IUP   - VSS      - Previous prenatal labs reviewed, NIPT low risk, maternal carrier screen negative    - Continue taking prenatal vitamins QD    - Continue taking lovenox for anticoagulation    - Tdap vaccination: already received     - Influenza vaccination: due this upcoming season     - Rhogam: not indicated    - COVID-19 vaccination x3   - RSV vaccination (32-36 weeks): not in season    -  testing completed today for advanced maternal age & chronic hypertension. Tracing reviewed and reactive    - Repeat c section scheduled for 25   Return in about 1 week (around 2025) for BRIE.    Counseling:   - Warning signs reviewed and

## 2025-07-02 ENCOUNTER — ROUTINE PRENATAL (OUTPATIENT)
Dept: OBGYN CLINIC | Age: 45
End: 2025-07-02
Payer: COMMERCIAL

## 2025-07-02 VITALS
DIASTOLIC BLOOD PRESSURE: 69 MMHG | WEIGHT: 194 LBS | BODY MASS INDEX: 31.31 KG/M2 | SYSTOLIC BLOOD PRESSURE: 111 MMHG | HEART RATE: 110 BPM

## 2025-07-02 DIAGNOSIS — O09.93 HIGH-RISK PREGNANCY IN THIRD TRIMESTER: Primary | ICD-10-CM

## 2025-07-02 DIAGNOSIS — Z3A.33 33 WEEKS GESTATION OF PREGNANCY: ICD-10-CM

## 2025-07-02 DIAGNOSIS — O10.919 CHRONIC HYPERTENSION AFFECTING PREGNANCY: ICD-10-CM

## 2025-07-02 DIAGNOSIS — O09.523 MULTIGRAVIDA OF ADVANCED MATERNAL AGE IN THIRD TRIMESTER: ICD-10-CM

## 2025-07-02 PROCEDURE — 99213 OFFICE O/P EST LOW 20 MIN: CPT | Performed by: STUDENT IN AN ORGANIZED HEALTH CARE EDUCATION/TRAINING PROGRAM

## 2025-07-02 PROCEDURE — 59025 FETAL NON-STRESS TEST: CPT | Performed by: STUDENT IN AN ORGANIZED HEALTH CARE EDUCATION/TRAINING PROGRAM

## 2025-07-09 ENCOUNTER — ROUTINE PRENATAL (OUTPATIENT)
Dept: OBGYN CLINIC | Age: 45
End: 2025-07-09

## 2025-07-09 VITALS
BODY MASS INDEX: 31.64 KG/M2 | DIASTOLIC BLOOD PRESSURE: 78 MMHG | WEIGHT: 196 LBS | HEART RATE: 89 BPM | SYSTOLIC BLOOD PRESSURE: 135 MMHG

## 2025-07-09 DIAGNOSIS — O10.919 CHRONIC HYPERTENSION AFFECTING PREGNANCY: ICD-10-CM

## 2025-07-09 DIAGNOSIS — Z3A.34 34 WEEKS GESTATION OF PREGNANCY: ICD-10-CM

## 2025-07-09 DIAGNOSIS — O09.93 HIGH-RISK PREGNANCY IN THIRD TRIMESTER: Primary | ICD-10-CM

## 2025-07-09 DIAGNOSIS — O09.529 ANTEPARTUM MULTIGRAVIDA OF ADVANCED MATERNAL AGE: ICD-10-CM

## 2025-07-09 NOTE — PROGRESS NOTES
Prenatal Visit    Aleksandra Vanegas is a 44 y.o. female  at 34w5d IUP    The patient was seen and evaluated. She has no complaints today.  She reports positive fetal movements. She denies contractions, vaginal bleeding and leakage of fluid. Signs and symptoms of labor and pre-eclampsia were reviewed with the patient in detail. She is to report any of these if they occur. She currently denies any of these.    The problem list reflects the active issues addressed during today's visit    Vitals:     BP: 135/78  Weight - Scale: 88.9 kg (196 lb)  Pulse: 89  Patient Position: Sitting  Fetal HR: 140  Movement: Present     PHYSICAL:   General appearance: no apparent distress, alert and cooperative  HEENT: head atraumatic, normocephalic, trachea midline, moist mucous membranes   Neurologic: alert, oriented, normal speech   Lungs: no increased work of breathing,   Abdomen: soft, gravid, non-tender on palpation    Musculoskeletal: no gross abnormalities, range of motion appropriate for age   Psychiatric: mood appropriate, normal affect      NST:   Fetal heart rate baseline: 140, moderate variability, accelerations present, absent decelerations     The tracing has been reviewed and is considered reactive.      Assessment & Plan:  Aleksandra Vanegas is a 44 y.o. female  at 34w5d   - VSS      - Discussed increasing iron intake leading into surgery               - Continue taking prenatal vitamins QD               - Continue taking lovenox for anticoagulation               - Tdap vaccination: already received                - Influenza vaccination: due this upcoming season                - Rhogam: not indicated               - COVID-19 vaccination x3              - RSV vaccination (32-36 weeks): not in season               -  testing completed today for advanced maternal age & chronic hypertension. Tracing reviewed and reactive               - Repeat c section scheduled for 25  Return in about 1 week (around

## 2025-07-10 ENCOUNTER — TELEPHONE (OUTPATIENT)
Dept: PSYCHIATRY | Age: 45
End: 2025-07-10

## 2025-07-10 NOTE — TELEPHONE ENCOUNTER
[facility] - General Clinical Note  Oralee RISA Hartley   7/10/2025  4:53 PM    Aleksandra Vanegas  1980  [id number]  [phone]     This service was provided on 7/10/25          Time spent with Patient: 2 minutes    [facility] received referral for patient regarding services.  Writer phoned patient.  We agreed for a call tomorrow morning to discuss what services patient is needing as patient was not available to talk.

## 2025-07-11 ENCOUNTER — TELEPHONE (OUTPATIENT)
Dept: PSYCHIATRY | Age: 45
End: 2025-07-11
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
## 2025-07-11 NOTE — TELEPHONE ENCOUNTER
[facility] - General Clinical Note  Teo Zeeshanlacho RISA   7/11/2025  9:53 AM    Aleksandra Vanegas  1980  [id number]  [phone]     This service was provided on 7/11/25          Time spent with Patient: 15 minutes    [facility] received a referral from LANNY Navarro for this patient.  Writer called patient and completed intake.  Patient reports that she does not have any current need, symptoms or reason to engage in therapy.   She was very pleasant and grateful for the phone call and discussion of her needs.  I advised patient that should she change her mind, she can call me directly and I will get her connected with services.

## 2025-07-16 ENCOUNTER — HOSPITAL ENCOUNTER (OUTPATIENT)
Age: 45
Setting detail: SPECIMEN
Discharge: HOME | End: 2025-07-16

## 2025-07-16 ENCOUNTER — ROUTINE PRENATAL (OUTPATIENT)
Dept: OBGYN CLINIC | Age: 45
End: 2025-07-16

## 2025-07-16 VITALS
BODY MASS INDEX: 31.88 KG/M2 | SYSTOLIC BLOOD PRESSURE: 120 MMHG | HEIGHT: 66 IN | HEART RATE: 92 BPM | WEIGHT: 198.4 LBS | DIASTOLIC BLOOD PRESSURE: 79 MMHG

## 2025-07-16 DIAGNOSIS — O09.93 HIGH-RISK PREGNANCY IN THIRD TRIMESTER: ICD-10-CM

## 2025-07-16 DIAGNOSIS — N89.8 VAGINAL DISCHARGE: ICD-10-CM

## 2025-07-16 DIAGNOSIS — O10.919 CHRONIC HYPERTENSION AFFECTING PREGNANCY: ICD-10-CM

## 2025-07-16 DIAGNOSIS — O09.523 ADVANCED MATERNAL AGE IN MULTIGRAVIDA, THIRD TRIMESTER: ICD-10-CM

## 2025-07-16 DIAGNOSIS — Z3A.35 35 WEEKS GESTATION OF PREGNANCY: ICD-10-CM

## 2025-07-16 DIAGNOSIS — O09.93 HIGH-RISK PREGNANCY IN THIRD TRIMESTER: Primary | ICD-10-CM

## 2025-07-16 LAB
CANDIDA SPECIES: NEGATIVE
GARDNERELLA VAGINALIS: POSITIVE
SOURCE: ABNORMAL
TRICHOMONAS: NEGATIVE

## 2025-07-16 NOTE — PROGRESS NOTES
Prenatal Visit    Aleksandra Vanegas is a 44 y.o. female  at 35w5d    The patient was seen and evaluated. She has no complaints today  besides increased vaginal discharge.   She reports positive fetal movements. She denies contractions, vaginal bleeding and leakage of fluid. Signs and symptoms of labor and pre-eclampsia were reviewed with the patient in detail. She currently denies any signs or symptoms of pre-clampsia including headache, vision changes, RUQ pain.     The problem list reflects the active issues addressed during today's visit.    Allergies:  No Known Allergies    Vitals:     BP: 120/79  Weight - Scale: 90 kg (198 lb 6.4 oz)  Pulse: 92  Patient Position: Sitting  Fetal HR: 155  Movement: Present     PHYSICAL:   General appearance: no apparent distress, alert and cooperative  HEENT: head atraumatic, normocephalic, trachea midline, moist mucous membranes   Neurologic: alert, oriented, normal speech   Lungs: no increased work of breathing,   Abdomen: soft, gravid, non-tender on palpation    Musculoskeletal: no gross abnormalities, range of motion appropriate for age   Psychiatric: mood appropriate, normal affect      NST:   Fetal heart rate baseline: 155, moderate variability, accelerations present, absent decelerations     The tracing has been reviewed and is considered reactive.      Assessment & Plan:  Aleksandra Vanegas is a 44 y.o. female  at 35w5d IUP   - VSS   - GBS collected today   - Vaginitis and GC/C collected for complaint of increased vaginal discharge     - Discussed increasing iron intake leading into surgery               - Continue taking prenatal vitamins QD               - Continue taking lovenox for anticoagulation               - Tdap vaccination: already received                - Influenza vaccination: due this upcoming season                - Rhogam: not indicated               - COVID-19 vaccination x3              - RSV vaccination (32-36 weeks): not in season

## 2025-07-17 LAB
C TRACH DNA SPEC QL PROBE+SIG AMP: NEGATIVE
N GONORRHOEA DNA SPEC QL PROBE+SIG AMP: NEGATIVE
SPECIMEN DESCRIPTION: NORMAL

## 2025-07-19 LAB
MICROORGANISM SPEC CULT: NORMAL
SERVICE CMNT-IMP: NORMAL
SPECIMEN DESCRIPTION: NORMAL

## 2025-07-22 ENCOUNTER — ROUTINE PRENATAL (OUTPATIENT)
Dept: OBGYN CLINIC | Age: 45
End: 2025-07-22
Payer: COMMERCIAL

## 2025-07-22 VITALS
WEIGHT: 200.2 LBS | BODY MASS INDEX: 32.17 KG/M2 | HEART RATE: 87 BPM | HEIGHT: 66 IN | SYSTOLIC BLOOD PRESSURE: 137 MMHG | DIASTOLIC BLOOD PRESSURE: 83 MMHG

## 2025-07-22 DIAGNOSIS — Z3A.36 36 WEEKS GESTATION OF PREGNANCY: Primary | ICD-10-CM

## 2025-07-22 PROCEDURE — 99213 OFFICE O/P EST LOW 20 MIN: CPT | Performed by: OBSTETRICS & GYNECOLOGY

## 2025-07-22 PROCEDURE — 59025 FETAL NON-STRESS TEST: CPT | Performed by: OBSTETRICS & GYNECOLOGY

## 2025-07-22 RX ORDER — TETRAHYDROZOLINE HCL 0.05 G/100ML
1 LIQUID OPHTHALMIC 3 TIMES DAILY
Qty: 15 ML | Refills: 0 | Status: SHIPPED | OUTPATIENT
Start: 2025-07-22

## 2025-07-22 NOTE — PROGRESS NOTES
Aleksandra is a  @ 36w5d who presents for BRIE visit.  She denies LOF, VB or Ctxs.  + FM.  She is having some cramping.  She denies any fevers/chills, SOB, cough, sore throat, loss of taste/smell or sick contacts.  Pt denies any HA, vision changes or RUQ pain.     O:  Vitals:    25 1345   BP: 137/83   Pulse: 87     Gen: NAD  Abd: soft, nontender, gravid  Ext:  no edema    NST: 145, mod variability, accels, no decels.  Category 1 FHTs, reactive.    BP: 137/83  Weight - Scale: 90.8 kg (200 lb 3.2 oz)  Pulse: 87  Fetal HR: rnst  Movement: Present    A/P:  Patient Active Problem List    Diagnosis Date Noted    Proteinuria affecting pregnancy in first trimester 2025     P/C elevated 0.39 on 25      AMA 2025     NST/BPP @ 35 wks per MFM   Del @ 39 wks per MFM      cHTN (no meds) 2025    Sixth pregnancy 2025    History of DVT of lower extremity 2025    History of gestational diabetes in prior pregnancy, currently pregnant 2025    H/O gastric bypass 2025     Patient to test blood glucose x2 weeks   Supplies sent 25      Hx of intestinal obstruction 2025    History of hysteroscopic myomectomy 2025     Myomectomy completed in  with Dr. Milo Salinas. There was no intra-abdominal portion of the procedure. No damage to uterine muscle. Fibroid was removed hysteroscopically.     Patient is opting for repeat .      History of pulmonary embolism 2025    Hx  x1 2025     G1 - Vag   G2 -        Patient planning for repeat  in this pregnancy. Patient also has a large uterine fibroid and is on therapeutic Lovenox. Understands Hemorrhage risk. TXA contraindicated technically due to extensive Hx VTE      Intramural leiomyoma of uterus 2025    Uterine fibroid complicating  care, baby not yet delivered 2025     Fundal 5 x 7 x 5 cm  25 8 x 7 x 4 cm       Questionable Antiphospholipid syndrome

## 2025-07-31 ENCOUNTER — ROUTINE PRENATAL (OUTPATIENT)
Dept: OBGYN CLINIC | Age: 45
End: 2025-07-31
Payer: COMMERCIAL

## 2025-07-31 VITALS
BODY MASS INDEX: 32.64 KG/M2 | DIASTOLIC BLOOD PRESSURE: 86 MMHG | SYSTOLIC BLOOD PRESSURE: 135 MMHG | WEIGHT: 202.2 LBS | HEART RATE: 91 BPM

## 2025-07-31 DIAGNOSIS — O10.919 CHRONIC HYPERTENSION AFFECTING PREGNANCY: ICD-10-CM

## 2025-07-31 DIAGNOSIS — Z3A.37 37 WEEKS GESTATION OF PREGNANCY: ICD-10-CM

## 2025-07-31 DIAGNOSIS — O09.523 ADVANCED MATERNAL AGE IN MULTIGRAVIDA, THIRD TRIMESTER: Primary | ICD-10-CM

## 2025-07-31 PROCEDURE — 99213 OFFICE O/P EST LOW 20 MIN: CPT | Performed by: STUDENT IN AN ORGANIZED HEALTH CARE EDUCATION/TRAINING PROGRAM

## 2025-07-31 NOTE — PROGRESS NOTES
Prenatal Visit    Aleksandra Vanegas is a 44 y.o. female  at 37w6d IUP    The patient was seen and evaluated. She has no complaints today. There was positive fetal movements. She denies contractions, vaginal bleeding and leakage of fluid. She currently denies any signs or symptoms of pre-eclampsia which include headache, vision changes, RUQ pain. Signs and symptoms of labor were reviewed.     Allergies:  Patient has no known allergies.    Vitals:  BP: 135/86  Weight - Scale: 91.7 kg (202 lb 3.2 oz)  Pulse: 91  Patient Position: Sitting  Movement: Present     Physical:   General appearance: no apparent distress, alert and cooperative  HEENT: head atraumatic, normocephalic, trachea midline, moist mucous membranes   Neurologic: alert, oriented, normal speech   Lungs: no increased work of breathing,   Abdomen: soft, gravid, non-tender on palpation    Musculoskeletal: no gross abnormalities, range of motion appropriate for age   Psychiatric: mood appropriate, normal affect      NST:  Tracing reviewed for 20 minutes    Baseline: 135bpm  Variability: moderate  Accelerations: present  Decelerations: isolated variable deceleration not associated with contraction and resolved spontaneously without intervention  TOCO: quiet    Tracing reassuring and reactive      Assessment & Plan:  Aleksandra Vanegas is a 44 y.o. female  at 37w6d IUP   - VSS    - GBS culture negative   - Continue taking prenatal vitamins    - Tdap vaccination: 25   - Rhogam: not indicated   -  testing indication: AMA, cHTN no meds    - Indications for  section: Hx  x1, Hx hysteroscopic myomectomy   - Soap and instructions given    - DC lovenox 24 h prior to case    Return for PP visit.    Counseling:   - Warning signs of  labor, pre-eclampsia, decreased fetal movement and recommendations when to call or present to the hospital reviewed   - Route of delivery and counseling on vaginal, operative vaginal, and

## 2025-08-03 RX ORDER — BUSPIRONE HYDROCHLORIDE 10 MG/1
5 TABLET ORAL EVERY MORNING
Status: CANCELLED | OUTPATIENT
Start: 2025-08-03

## 2025-08-03 RX ORDER — DEXTROAMPHETAMINE SACCHARATE, AMPHETAMINE ASPARTATE, DEXTROAMPHETAMINE SULFATE AND AMPHETAMINE SULFATE 5; 5; 5; 5 MG/1; MG/1; MG/1; MG/1
20 TABLET ORAL DAILY
Refills: 0 | Status: CANCELLED | OUTPATIENT
Start: 2025-08-03

## 2025-08-03 RX ORDER — AMLODIPINE BESYLATE 5 MG/1
5 TABLET ORAL DAILY
Status: CANCELLED | OUTPATIENT
Start: 2025-08-03

## 2025-08-04 ENCOUNTER — ANESTHESIA EVENT (OUTPATIENT)
Dept: LABOR AND DELIVERY | Age: 45
End: 2025-08-04
Payer: COMMERCIAL

## 2025-08-05 ENCOUNTER — HOSPITAL ENCOUNTER (INPATIENT)
Age: 45
LOS: 3 days | Discharge: HOME | End: 2025-08-08
Attending: STUDENT IN AN ORGANIZED HEALTH CARE EDUCATION/TRAINING PROGRAM | Admitting: STUDENT IN AN ORGANIZED HEALTH CARE EDUCATION/TRAINING PROGRAM
Payer: COMMERCIAL

## 2025-08-05 ENCOUNTER — ANESTHESIA (OUTPATIENT)
Dept: LABOR AND DELIVERY | Age: 45
End: 2025-08-05
Payer: COMMERCIAL

## 2025-08-05 DIAGNOSIS — Z98.891 S/P CESAREAN SECTION: Primary | ICD-10-CM

## 2025-08-05 PROBLEM — O09.93 HIGH-RISK PREGNANCY IN THIRD TRIMESTER: Status: ACTIVE | Noted: 2025-08-05

## 2025-08-05 LAB
ABO + RH BLD: NORMAL
ALBUMIN SERPL-MCNC: 3.4 G/DL (ref 3.5–5.2)
ALBUMIN/GLOB SERPL: 1.3 {RATIO} (ref 1–2.5)
ALP SERPL-CCNC: 136 U/L (ref 35–104)
ALT SERPL-CCNC: 11 U/L (ref 10–35)
AMPHET UR QL SCN: NEGATIVE
ANION GAP SERPL CALCULATED.3IONS-SCNC: 12 MMOL/L (ref 9–16)
ARM BAND NUMBER: NORMAL
AST SERPL-CCNC: 20 U/L (ref 10–35)
BARBITURATES UR QL SCN: NEGATIVE
BENZODIAZ UR QL: NEGATIVE
BILIRUB SERPL-MCNC: 0.3 MG/DL (ref 0–1.2)
BLOOD BANK SAMPLE EXPIRATION: NORMAL
BLOOD GROUP ANTIBODIES SERPL: NEGATIVE
BUN SERPL-MCNC: 3 MG/DL (ref 6–20)
CALCIUM SERPL-MCNC: 8.8 MG/DL (ref 8.6–10.4)
CANNABINOIDS UR QL SCN: NEGATIVE
CHLORIDE SERPL-SCNC: 107 MMOL/L (ref 98–107)
CO2 SERPL-SCNC: 18 MMOL/L (ref 20–31)
COCAINE UR QL SCN: NEGATIVE
CREAT SERPL-MCNC: 0.5 MG/DL (ref 0.6–0.9)
CREAT UR-MCNC: 28.2 MG/DL (ref 28–217)
ERYTHROCYTE [DISTWIDTH] IN BLOOD BY AUTOMATED COUNT: 14.6 % (ref 11.8–14.4)
ERYTHROCYTE [DISTWIDTH] IN BLOOD BY AUTOMATED COUNT: 14.6 % (ref 11.8–14.4)
FENTANYL UR QL: NEGATIVE
FIBRINOGEN PPP-MCNC: 434 MG/DL (ref 203–521)
GFR, ESTIMATED: >90 ML/MIN/1.73M2
GLUCOSE SERPL-MCNC: 110 MG/DL (ref 74–99)
HCT VFR BLD AUTO: 27.6 % (ref 36.3–47.1)
HCT VFR BLD AUTO: 31.7 % (ref 36.3–47.1)
HGB BLD-MCNC: 10.2 G/DL (ref 11.9–15.1)
HGB BLD-MCNC: 8.8 G/DL (ref 11.9–15.1)
INR PPP: 1
MCH RBC QN AUTO: 29.2 PG (ref 25.2–33.5)
MCH RBC QN AUTO: 29.4 PG (ref 25.2–33.5)
MCHC RBC AUTO-ENTMCNC: 31.9 G/DL (ref 28.4–34.8)
MCHC RBC AUTO-ENTMCNC: 32.2 G/DL (ref 28.4–34.8)
MCV RBC AUTO: 90.8 FL (ref 82.6–102.9)
MCV RBC AUTO: 92.3 FL (ref 82.6–102.9)
METHADONE UR QL: NEGATIVE
NRBC BLD-RTO: 0 PER 100 WBC
NRBC BLD-RTO: 0.3 PER 100 WBC
OPIATES UR QL SCN: NEGATIVE
OXYCODONE UR QL SCN: NEGATIVE
PARTIAL THROMBOPLASTIN TIME: 25.1 SEC (ref 23–36.5)
PCP UR QL SCN: NEGATIVE
PLATELET # BLD AUTO: 282 K/UL (ref 138–453)
PLATELET # BLD AUTO: 306 K/UL (ref 138–453)
PMV BLD AUTO: 11 FL (ref 8.1–13.5)
PMV BLD AUTO: 11 FL (ref 8.1–13.5)
POTASSIUM SERPL-SCNC: 3.3 MMOL/L (ref 3.7–5.3)
PROT SERPL-MCNC: 6 G/DL (ref 6.6–8.7)
PROTHROMBIN TIME: 13.8 SEC (ref 11.7–14.9)
RBC # BLD AUTO: 2.99 M/UL (ref 3.95–5.11)
RBC # BLD AUTO: 3.49 M/UL (ref 3.95–5.11)
SODIUM SERPL-SCNC: 137 MMOL/L (ref 136–145)
T PALLIDUM AB SER QL IA: NONREACTIVE
TEST INFORMATION: NORMAL
TOTAL PROTEIN, URINE: 9 MG/DL
URINE TOTAL PROTEIN CREATININE RATIO: 0.32 (ref 0–0.2)
WBC OTHER # BLD: 10.1 K/UL (ref 3.5–11.3)
WBC OTHER # BLD: 21.6 K/UL (ref 3.5–11.3)

## 2025-08-05 PROCEDURE — 86780 TREPONEMA PALLIDUM: CPT

## 2025-08-05 PROCEDURE — 2580000003 HC RX 258: Performed by: NURSE ANESTHETIST, CERTIFIED REGISTERED

## 2025-08-05 PROCEDURE — 6360000002 HC RX W HCPCS

## 2025-08-05 PROCEDURE — 85610 PROTHROMBIN TIME: CPT

## 2025-08-05 PROCEDURE — 80307 DRUG TEST PRSMV CHEM ANLYZR: CPT

## 2025-08-05 PROCEDURE — 6360000002 HC RX W HCPCS: Performed by: ANESTHESIOLOGY

## 2025-08-05 PROCEDURE — 6360000002 HC RX W HCPCS: Performed by: NURSE ANESTHETIST, CERTIFIED REGISTERED

## 2025-08-05 PROCEDURE — 85730 THROMBOPLASTIN TIME PARTIAL: CPT

## 2025-08-05 PROCEDURE — 2580000003 HC RX 258

## 2025-08-05 PROCEDURE — 2500000003 HC RX 250 WO HCPCS

## 2025-08-05 PROCEDURE — 7100000001 HC PACU RECOVERY - ADDTL 15 MIN: Performed by: STUDENT IN AN ORGANIZED HEALTH CARE EDUCATION/TRAINING PROGRAM

## 2025-08-05 PROCEDURE — 3700000000 HC ANESTHESIA ATTENDED CARE: Performed by: STUDENT IN AN ORGANIZED HEALTH CARE EDUCATION/TRAINING PROGRAM

## 2025-08-05 PROCEDURE — 3609079900 HC CESAREAN SECTION: Performed by: STUDENT IN AN ORGANIZED HEALTH CARE EDUCATION/TRAINING PROGRAM

## 2025-08-05 PROCEDURE — 86850 RBC ANTIBODY SCREEN: CPT

## 2025-08-05 PROCEDURE — 85384 FIBRINOGEN ACTIVITY: CPT

## 2025-08-05 PROCEDURE — 59514 CESAREAN DELIVERY ONLY: CPT | Performed by: STUDENT IN AN ORGANIZED HEALTH CARE EDUCATION/TRAINING PROGRAM

## 2025-08-05 PROCEDURE — 6370000000 HC RX 637 (ALT 250 FOR IP)

## 2025-08-05 PROCEDURE — 1220000000 HC SEMI PRIVATE OB R&B

## 2025-08-05 PROCEDURE — 82570 ASSAY OF URINE CREATININE: CPT

## 2025-08-05 PROCEDURE — 7100000000 HC PACU RECOVERY - FIRST 15 MIN: Performed by: STUDENT IN AN ORGANIZED HEALTH CARE EDUCATION/TRAINING PROGRAM

## 2025-08-05 PROCEDURE — 3700000001 HC ADD 15 MINUTES (ANESTHESIA): Performed by: STUDENT IN AN ORGANIZED HEALTH CARE EDUCATION/TRAINING PROGRAM

## 2025-08-05 PROCEDURE — 36415 COLL VENOUS BLD VENIPUNCTURE: CPT

## 2025-08-05 PROCEDURE — 86900 BLOOD TYPING SEROLOGIC ABO: CPT

## 2025-08-05 PROCEDURE — 88307 TISSUE EXAM BY PATHOLOGIST: CPT

## 2025-08-05 PROCEDURE — 99465 NB RESUSCITATION: CPT

## 2025-08-05 PROCEDURE — 84156 ASSAY OF PROTEIN URINE: CPT

## 2025-08-05 PROCEDURE — 85027 COMPLETE CBC AUTOMATED: CPT

## 2025-08-05 PROCEDURE — 86901 BLOOD TYPING SEROLOGIC RH(D): CPT

## 2025-08-05 PROCEDURE — 80053 COMPREHEN METABOLIC PANEL: CPT

## 2025-08-05 RX ORDER — ENOXAPARIN SODIUM 150 MG/ML
1.5 INJECTION SUBCUTANEOUS DAILY
Status: DISCONTINUED | OUTPATIENT
Start: 2025-08-06 | End: 2025-08-08 | Stop reason: HOSPADM

## 2025-08-05 RX ORDER — SODIUM CHLORIDE 0.9 % (FLUSH) 0.9 %
10 SYRINGE (ML) INJECTION PRN
Status: DISCONTINUED | OUTPATIENT
Start: 2025-08-05 | End: 2025-08-05

## 2025-08-05 RX ORDER — ONDANSETRON 2 MG/ML
4 INJECTION INTRAMUSCULAR; INTRAVENOUS EVERY 6 HOURS PRN
Status: DISCONTINUED | OUTPATIENT
Start: 2025-08-05 | End: 2025-08-05 | Stop reason: HOSPADM

## 2025-08-05 RX ORDER — IBUPROFEN 600 MG/1
600 TABLET, FILM COATED ORAL EVERY 6 HOURS
Status: DISCONTINUED | OUTPATIENT
Start: 2025-08-06 | End: 2025-08-08 | Stop reason: HOSPADM

## 2025-08-05 RX ORDER — TRANEXAMIC ACID 100 MG/ML
15 INJECTION, SOLUTION INTRAVENOUS ONCE
Status: DISCONTINUED | OUTPATIENT
Start: 2025-08-05 | End: 2025-08-05

## 2025-08-05 RX ORDER — ACETAMINOPHEN 500 MG
1000 TABLET ORAL EVERY 6 HOURS
Qty: 60 TABLET | Refills: 1 | Status: SHIPPED | OUTPATIENT
Start: 2025-08-05

## 2025-08-05 RX ORDER — ENOXAPARIN SODIUM 100 MG/ML
1.5 INJECTION SUBCUTANEOUS DAILY
Qty: 63 ML | Refills: 0 | Status: SHIPPED | OUTPATIENT
Start: 2025-08-05 | End: 2025-09-16

## 2025-08-05 RX ORDER — SIMETHICONE 80 MG
80 TABLET,CHEWABLE ORAL 4 TIMES DAILY PRN
Qty: 30 TABLET | Refills: 0 | Status: SHIPPED | OUTPATIENT
Start: 2025-08-05

## 2025-08-05 RX ORDER — LANOLIN
CREAM (ML) TOPICAL
Status: DISCONTINUED | OUTPATIENT
Start: 2025-08-05 | End: 2025-08-08 | Stop reason: HOSPADM

## 2025-08-05 RX ORDER — MORPHINE SULFATE 1 MG/ML
INJECTION, SOLUTION EPIDURAL; INTRATHECAL; INTRAVENOUS
Status: COMPLETED | OUTPATIENT
Start: 2025-08-05 | End: 2025-08-05

## 2025-08-05 RX ORDER — TRANEXAMIC ACID 10 MG/ML
1000 INJECTION, SOLUTION INTRAVENOUS ONCE
Status: DISCONTINUED | OUTPATIENT
Start: 2025-08-05 | End: 2025-08-05

## 2025-08-05 RX ORDER — METRONIDAZOLE 500 MG/1
500 TABLET ORAL EVERY 8 HOURS SCHEDULED
Status: DISCONTINUED | OUTPATIENT
Start: 2025-08-05 | End: 2025-08-05

## 2025-08-05 RX ORDER — SIMETHICONE 80 MG
80 TABLET,CHEWABLE ORAL EVERY 6 HOURS PRN
Status: DISCONTINUED | OUTPATIENT
Start: 2025-08-05 | End: 2025-08-08 | Stop reason: HOSPADM

## 2025-08-05 RX ORDER — ONDANSETRON 4 MG/1
4 TABLET, ORALLY DISINTEGRATING ORAL EVERY 8 HOURS PRN
Status: DISCONTINUED | OUTPATIENT
Start: 2025-08-05 | End: 2025-08-08 | Stop reason: HOSPADM

## 2025-08-05 RX ORDER — SODIUM CHLORIDE, SODIUM LACTATE, POTASSIUM CHLORIDE, AND CALCIUM CHLORIDE .6; .31; .03; .02 G/100ML; G/100ML; G/100ML; G/100ML
1000 INJECTION, SOLUTION INTRAVENOUS ONCE
Status: DISCONTINUED | OUTPATIENT
Start: 2025-08-05 | End: 2025-08-05

## 2025-08-05 RX ORDER — ACETAMINOPHEN 500 MG
1000 TABLET ORAL EVERY 6 HOURS SCHEDULED
Status: DISCONTINUED | OUTPATIENT
Start: 2025-08-05 | End: 2025-08-08 | Stop reason: HOSPADM

## 2025-08-05 RX ORDER — ONDANSETRON 2 MG/ML
4 INJECTION INTRAMUSCULAR; INTRAVENOUS EVERY 6 HOURS PRN
Status: DISCONTINUED | OUTPATIENT
Start: 2025-08-05 | End: 2025-08-05

## 2025-08-05 RX ORDER — ACETAMINOPHEN 325 MG/1
975 TABLET ORAL ONCE
Status: COMPLETED | OUTPATIENT
Start: 2025-08-05 | End: 2025-08-05

## 2025-08-05 RX ORDER — SCOPOLAMINE 1 MG/3D
1 PATCH, EXTENDED RELEASE TRANSDERMAL
Status: DISCONTINUED | OUTPATIENT
Start: 2025-08-05 | End: 2025-08-05

## 2025-08-05 RX ORDER — SODIUM CHLORIDE 0.9 % (FLUSH) 0.9 %
5-40 SYRINGE (ML) INJECTION EVERY 12 HOURS SCHEDULED
Status: DISCONTINUED | OUTPATIENT
Start: 2025-08-05 | End: 2025-08-08 | Stop reason: HOSPADM

## 2025-08-05 RX ORDER — SENNA AND DOCUSATE SODIUM 50; 8.6 MG/1; MG/1
2 TABLET, FILM COATED ORAL DAILY
Qty: 60 TABLET | Refills: 1 | Status: SHIPPED | OUTPATIENT
Start: 2025-08-05

## 2025-08-05 RX ORDER — SODIUM CHLORIDE 9 MG/ML
INJECTION, SOLUTION INTRAVENOUS PRN
Status: DISCONTINUED | OUTPATIENT
Start: 2025-08-05 | End: 2025-08-08 | Stop reason: HOSPADM

## 2025-08-05 RX ORDER — NALBUPHINE HYDROCHLORIDE 10 MG/ML
5 INJECTION INTRAMUSCULAR; INTRAVENOUS; SUBCUTANEOUS EVERY 4 HOURS PRN
Status: DISCONTINUED | OUTPATIENT
Start: 2025-08-05 | End: 2025-08-08 | Stop reason: HOSPADM

## 2025-08-05 RX ORDER — SODIUM CHLORIDE, SODIUM LACTATE, POTASSIUM CHLORIDE, CALCIUM CHLORIDE 600; 310; 30; 20 MG/100ML; MG/100ML; MG/100ML; MG/100ML
INJECTION, SOLUTION INTRAVENOUS CONTINUOUS
Status: DISCONTINUED | OUTPATIENT
Start: 2025-08-05 | End: 2025-08-06

## 2025-08-05 RX ORDER — SODIUM CHLORIDE 9 MG/ML
INJECTION, SOLUTION INTRAVENOUS PRN
Status: DISCONTINUED | OUTPATIENT
Start: 2025-08-05 | End: 2025-08-05

## 2025-08-05 RX ORDER — OXYCODONE HYDROCHLORIDE 5 MG/1
5 TABLET ORAL EVERY 6 HOURS PRN
Qty: 18 TABLET | Refills: 0 | Status: SHIPPED | OUTPATIENT
Start: 2025-08-05 | End: 2025-08-10

## 2025-08-05 RX ORDER — CITRIC ACID/SODIUM CITRATE 334-500MG
30 SOLUTION, ORAL ORAL ONCE
Status: COMPLETED | OUTPATIENT
Start: 2025-08-05 | End: 2025-08-05

## 2025-08-05 RX ORDER — SODIUM CHLORIDE 9 MG/ML
INJECTION, SOLUTION INTRAVENOUS CONTINUOUS
Status: DISCONTINUED | OUTPATIENT
Start: 2025-08-05 | End: 2025-08-05

## 2025-08-05 RX ORDER — SENNA AND DOCUSATE SODIUM 50; 8.6 MG/1; MG/1
2 TABLET, FILM COATED ORAL 2 TIMES DAILY
Status: DISCONTINUED | OUTPATIENT
Start: 2025-08-05 | End: 2025-08-08 | Stop reason: HOSPADM

## 2025-08-05 RX ORDER — POLYETHYLENE GLYCOL 3350 17 G/17G
17 POWDER, FOR SOLUTION ORAL DAILY PRN
Status: DISCONTINUED | OUTPATIENT
Start: 2025-08-05 | End: 2025-08-08 | Stop reason: HOSPADM

## 2025-08-05 RX ORDER — KETOROLAC TROMETHAMINE 30 MG/ML
30 INJECTION, SOLUTION INTRAMUSCULAR; INTRAVENOUS EVERY 6 HOURS
Status: COMPLETED | OUTPATIENT
Start: 2025-08-05 | End: 2025-08-06

## 2025-08-05 RX ORDER — VITAMIN A, ASCORBIC ACID, CHOLECALCIFEROL, .ALPHA.-TOCOPHEROL ACETATE, DL-, THIAMINE MONONITRATE, RIBOFLAVIN, NIACINAMIDE, PYRIDOXINE HYDROCHLORIDE, FOLIC ACID, CYANOCOBALAMIN, CALCIUM CARBONATE, IRON, ZINC OXIDE, AND CUPRIC OXIDE 4000; 120; 400; 22; 1.84; 3; 20; 10; 1; 12; 200; 29; 25; 2 [IU]/1; MG/1; [IU]/1; [IU]/1; MG/1; MG/1; MG/1; MG/1; MG/1; UG/1; MG/1; MG/1; MG/1; MG/1
1 TABLET ORAL DAILY
Status: DISCONTINUED | OUTPATIENT
Start: 2025-08-05 | End: 2025-08-08 | Stop reason: HOSPADM

## 2025-08-05 RX ORDER — ONDANSETRON 2 MG/ML
INJECTION INTRAMUSCULAR; INTRAVENOUS
Status: DISCONTINUED | OUTPATIENT
Start: 2025-08-05 | End: 2025-08-05 | Stop reason: SDUPTHER

## 2025-08-05 RX ORDER — BUPIVACAINE HYDROCHLORIDE 7.5 MG/ML
INJECTION, SOLUTION INTRASPINAL
Status: COMPLETED | OUTPATIENT
Start: 2025-08-05 | End: 2025-08-05

## 2025-08-05 RX ORDER — LOPERAMIDE HYDROCHLORIDE 2 MG/1
2 CAPSULE ORAL PRN
Status: DISCONTINUED | OUTPATIENT
Start: 2025-08-05 | End: 2025-08-08 | Stop reason: HOSPADM

## 2025-08-05 RX ORDER — ONDANSETRON 2 MG/ML
4 INJECTION INTRAMUSCULAR; INTRAVENOUS EVERY 6 HOURS PRN
Status: DISCONTINUED | OUTPATIENT
Start: 2025-08-05 | End: 2025-08-08 | Stop reason: HOSPADM

## 2025-08-05 RX ORDER — OXYCODONE HYDROCHLORIDE 5 MG/1
5 TABLET ORAL EVERY 4 HOURS PRN
Status: DISCONTINUED | OUTPATIENT
Start: 2025-08-05 | End: 2025-08-08 | Stop reason: HOSPADM

## 2025-08-05 RX ORDER — TRANEXAMIC ACID 100 MG/ML
1000 INJECTION, SOLUTION INTRAVENOUS ONCE
Status: COMPLETED | OUTPATIENT
Start: 2025-08-05 | End: 2025-08-05

## 2025-08-05 RX ORDER — DEXAMETHASONE SODIUM PHOSPHATE 10 MG/ML
INJECTION, SOLUTION INTRAMUSCULAR; INTRAVENOUS
Status: DISCONTINUED | OUTPATIENT
Start: 2025-08-05 | End: 2025-08-05 | Stop reason: SDUPTHER

## 2025-08-05 RX ORDER — IBUPROFEN 600 MG/1
600 TABLET, FILM COATED ORAL EVERY 6 HOURS
Qty: 30 TABLET | Refills: 1 | Status: SHIPPED | OUTPATIENT
Start: 2025-08-05 | End: 2025-08-05 | Stop reason: HOSPADM

## 2025-08-05 RX ORDER — SODIUM CHLORIDE 0.9 % (FLUSH) 0.9 %
5-40 SYRINGE (ML) INJECTION PRN
Status: DISCONTINUED | OUTPATIENT
Start: 2025-08-05 | End: 2025-08-08 | Stop reason: HOSPADM

## 2025-08-05 RX ORDER — CEPHALEXIN 500 MG/1
500 CAPSULE ORAL EVERY 8 HOURS SCHEDULED
Status: DISCONTINUED | OUTPATIENT
Start: 2025-08-05 | End: 2025-08-05

## 2025-08-05 RX ORDER — OXYCODONE HYDROCHLORIDE 5 MG/1
10 TABLET ORAL EVERY 4 HOURS PRN
Status: DISCONTINUED | OUTPATIENT
Start: 2025-08-05 | End: 2025-08-08 | Stop reason: HOSPADM

## 2025-08-05 RX ADMIN — SODIUM CHLORIDE, SODIUM LACTATE, POTASSIUM CHLORIDE, AND CALCIUM CHLORIDE: .6; .31; .03; .02 INJECTION, SOLUTION INTRAVENOUS at 18:40

## 2025-08-05 RX ADMIN — MORPHINE SULFATE 0.2 MG: 1 INJECTION, SOLUTION EPIDURAL; INTRATHECAL; INTRAVENOUS at 14:09

## 2025-08-05 RX ADMIN — KETOROLAC TROMETHAMINE 30 MG: 30 INJECTION, SOLUTION INTRAMUSCULAR at 16:46

## 2025-08-05 RX ADMIN — KETOROLAC TROMETHAMINE 30 MG: 30 INJECTION, SOLUTION INTRAMUSCULAR at 22:50

## 2025-08-05 RX ADMIN — PHENYLEPHRINE HYDROCHLORIDE 25 MCG/MIN: 10 INJECTION INTRAVENOUS at 14:15

## 2025-08-05 RX ADMIN — PHENYLEPHRINE HYDROCHLORIDE 100 MCG: 10 INJECTION INTRAVENOUS at 14:14

## 2025-08-05 RX ADMIN — PHENYLEPHRINE HYDROCHLORIDE 100 MCG: 10 INJECTION INTRAVENOUS at 14:15

## 2025-08-05 RX ADMIN — FAMOTIDINE 20 MG: 10 INJECTION, SOLUTION INTRAVENOUS at 12:55

## 2025-08-05 RX ADMIN — ONDANSETRON 4 MG: 2 INJECTION, SOLUTION INTRAMUSCULAR; INTRAVENOUS at 20:05

## 2025-08-05 RX ADMIN — DEXAMETHASONE SODIUM PHOSPHATE 10 MG: 10 INJECTION, SOLUTION INTRAMUSCULAR; INTRAVENOUS at 14:35

## 2025-08-05 RX ADMIN — SODIUM CITRATE AND CITRIC ACID MONOHYDRATE 30 ML: 500; 334 SOLUTION ORAL at 12:54

## 2025-08-05 RX ADMIN — PHENYLEPHRINE HYDROCHLORIDE 100 MCG: 10 INJECTION INTRAVENOUS at 14:25

## 2025-08-05 RX ADMIN — HYDROMORPHONE HYDROCHLORIDE 0.5 MG: 1 INJECTION, SOLUTION INTRAMUSCULAR; INTRAVENOUS; SUBCUTANEOUS at 18:51

## 2025-08-05 RX ADMIN — SODIUM CHLORIDE: 0.9 INJECTION, SOLUTION INTRAVENOUS at 12:51

## 2025-08-05 RX ADMIN — Medication 909 ML/HR: at 14:32

## 2025-08-05 RX ADMIN — ACETAMINOPHEN 1000 MG: 500 TABLET ORAL at 22:50

## 2025-08-05 RX ADMIN — CEFAZOLIN 2000 MG: 10 INJECTION, POWDER, FOR SOLUTION INTRAVENOUS at 13:25

## 2025-08-05 RX ADMIN — BUPIVACAINE HYDROCHLORIDE IN DEXTROSE 15 MG: 7.5 INJECTION, SOLUTION SUBARACHNOID at 14:09

## 2025-08-05 RX ADMIN — ACETAMINOPHEN 975 MG: 325 TABLET ORAL at 12:50

## 2025-08-05 RX ADMIN — ONDANSETRON 4 MG: 2 INJECTION, SOLUTION INTRAMUSCULAR; INTRAVENOUS at 14:23

## 2025-08-05 RX ADMIN — TRANEXAMIC ACID 1000 MG: 100 INJECTION, SOLUTION INTRAVENOUS at 19:35

## 2025-08-05 ASSESSMENT — PAIN DESCRIPTION - LOCATION
LOCATION: ABDOMEN
LOCATION: INCISION

## 2025-08-05 ASSESSMENT — PAIN SCALES - GENERAL
PAINLEVEL_OUTOF10: 4
PAINLEVEL_OUTOF10: 1
PAINLEVEL_OUTOF10: 8
PAINLEVEL_OUTOF10: 4

## 2025-08-05 ASSESSMENT — PAIN DESCRIPTION - DESCRIPTORS: DESCRIPTORS: SORE

## 2025-08-06 LAB
ERYTHROCYTE [DISTWIDTH] IN BLOOD BY AUTOMATED COUNT: 14.6 % (ref 11.8–14.4)
HCT VFR BLD AUTO: 24.6 % (ref 36.3–47.1)
HGB BLD-MCNC: 7.8 G/DL (ref 11.9–15.1)
MCH RBC QN AUTO: 29 PG (ref 25.2–33.5)
MCHC RBC AUTO-ENTMCNC: 31.7 G/DL (ref 28.4–34.8)
MCV RBC AUTO: 91.4 FL (ref 82.6–102.9)
NRBC BLD-RTO: 0.1 PER 100 WBC
PLATELET # BLD AUTO: 286 K/UL (ref 138–453)
PMV BLD AUTO: 11.1 FL (ref 8.1–13.5)
RBC # BLD AUTO: 2.69 M/UL (ref 3.95–5.11)
WBC OTHER # BLD: 25.4 K/UL (ref 3.5–11.3)

## 2025-08-06 PROCEDURE — 6370000000 HC RX 637 (ALT 250 FOR IP)

## 2025-08-06 PROCEDURE — 1220000000 HC SEMI PRIVATE OB R&B

## 2025-08-06 PROCEDURE — 36415 COLL VENOUS BLD VENIPUNCTURE: CPT

## 2025-08-06 PROCEDURE — 2500000003 HC RX 250 WO HCPCS

## 2025-08-06 PROCEDURE — 85027 COMPLETE CBC AUTOMATED: CPT

## 2025-08-06 PROCEDURE — 6360000002 HC RX W HCPCS

## 2025-08-06 RX ORDER — FERROUS SULFATE 325(65) MG
325 TABLET ORAL 2 TIMES DAILY
Qty: 90 TABLET | Refills: 3 | Status: SHIPPED | OUTPATIENT
Start: 2025-08-06

## 2025-08-06 RX ORDER — NIFEDIPINE 30 MG/1
60 TABLET, EXTENDED RELEASE ORAL DAILY
Status: DISCONTINUED | OUTPATIENT
Start: 2025-08-07 | End: 2025-08-08

## 2025-08-06 RX ORDER — POTASSIUM CHLORIDE 1500 MG/1
40 TABLET, EXTENDED RELEASE ORAL PRN
Status: DISCONTINUED | OUTPATIENT
Start: 2025-08-06 | End: 2025-08-08 | Stop reason: HOSPADM

## 2025-08-06 RX ORDER — NIFEDIPINE 30 MG/1
30 TABLET, EXTENDED RELEASE ORAL DAILY
Status: DISCONTINUED | OUTPATIENT
Start: 2025-08-06 | End: 2025-08-06

## 2025-08-06 RX ORDER — NIFEDIPINE 30 MG/1
30 TABLET, EXTENDED RELEASE ORAL ONCE
Status: COMPLETED | OUTPATIENT
Start: 2025-08-06 | End: 2025-08-06

## 2025-08-06 RX ORDER — POTASSIUM CHLORIDE 7.45 MG/ML
10 INJECTION INTRAVENOUS PRN
Status: DISCONTINUED | OUTPATIENT
Start: 2025-08-06 | End: 2025-08-08 | Stop reason: HOSPADM

## 2025-08-06 RX ADMIN — ACETAMINOPHEN 1000 MG: 500 TABLET ORAL at 23:55

## 2025-08-06 RX ADMIN — Medication 1 TABLET: at 09:29

## 2025-08-06 RX ADMIN — ACETAMINOPHEN 1000 MG: 500 TABLET ORAL at 11:26

## 2025-08-06 RX ADMIN — SODIUM CHLORIDE, PRESERVATIVE FREE 10 ML: 5 INJECTION INTRAVENOUS at 19:43

## 2025-08-06 RX ADMIN — KETOROLAC TROMETHAMINE 30 MG: 30 INJECTION, SOLUTION INTRAMUSCULAR at 04:58

## 2025-08-06 RX ADMIN — SODIUM CHLORIDE, PRESERVATIVE FREE 10 ML: 5 INJECTION INTRAVENOUS at 11:25

## 2025-08-06 RX ADMIN — ENOXAPARIN SODIUM 135 MG: 150 INJECTION SUBCUTANEOUS at 09:30

## 2025-08-06 RX ADMIN — NIFEDIPINE 30 MG: 30 TABLET, FILM COATED, EXTENDED RELEASE ORAL at 14:21

## 2025-08-06 RX ADMIN — SENNOSIDES, DOCUSATE SODIUM 2 TABLET: 50; 8.6 TABLET, FILM COATED ORAL at 19:41

## 2025-08-06 RX ADMIN — OXYCODONE 10 MG: 5 TABLET ORAL at 23:55

## 2025-08-06 RX ADMIN — SENNOSIDES, DOCUSATE SODIUM 2 TABLET: 50; 8.6 TABLET, FILM COATED ORAL at 09:29

## 2025-08-06 RX ADMIN — IBUPROFEN 600 MG: 600 TABLET ORAL at 17:25

## 2025-08-06 RX ADMIN — OXYCODONE 10 MG: 5 TABLET ORAL at 19:41

## 2025-08-06 RX ADMIN — OXYCODONE 5 MG: 5 TABLET ORAL at 09:33

## 2025-08-06 RX ADMIN — KETOROLAC TROMETHAMINE 30 MG: 30 INJECTION, SOLUTION INTRAMUSCULAR at 11:26

## 2025-08-06 RX ADMIN — NIFEDIPINE 30 MG: 30 TABLET, FILM COATED, EXTENDED RELEASE ORAL at 06:33

## 2025-08-06 RX ADMIN — POTASSIUM CHLORIDE 40 MEQ: 1500 TABLET, EXTENDED RELEASE ORAL at 09:29

## 2025-08-06 RX ADMIN — ACETAMINOPHEN 1000 MG: 500 TABLET ORAL at 04:58

## 2025-08-06 RX ADMIN — OXYCODONE 5 MG: 5 TABLET ORAL at 14:21

## 2025-08-06 RX ADMIN — ACETAMINOPHEN 1000 MG: 500 TABLET ORAL at 17:25

## 2025-08-06 ASSESSMENT — PAIN DESCRIPTION - LOCATION
LOCATION: INCISION
LOCATION: INCISION
LOCATION: ABDOMEN
LOCATION: INCISION
LOCATION: ABDOMEN
LOCATION: INCISION

## 2025-08-06 ASSESSMENT — PAIN DESCRIPTION - ORIENTATION
ORIENTATION: MID
ORIENTATION: MID;LOWER

## 2025-08-06 ASSESSMENT — PAIN SCALES - GENERAL
PAINLEVEL_OUTOF10: 5
PAINLEVEL_OUTOF10: 6
PAINLEVEL_OUTOF10: 5
PAINLEVEL_OUTOF10: 6
PAINLEVEL_OUTOF10: 5
PAINLEVEL_OUTOF10: 7
PAINLEVEL_OUTOF10: 7

## 2025-08-06 ASSESSMENT — PAIN DESCRIPTION - DESCRIPTORS
DESCRIPTORS: ACHING
DESCRIPTORS: ACHING;DISCOMFORT
DESCRIPTORS: ACHING;DISCOMFORT

## 2025-08-06 ASSESSMENT — PAIN DESCRIPTION - PAIN TYPE
TYPE: SURGICAL PAIN
TYPE: SURGICAL PAIN

## 2025-08-07 LAB
ALBUMIN SERPL-MCNC: 2.7 G/DL (ref 3.5–5.2)
ALBUMIN/GLOB SERPL: 1.1 {RATIO} (ref 1–2.5)
ALP SERPL-CCNC: 116 U/L (ref 35–104)
ALT SERPL-CCNC: 12 U/L (ref 10–35)
ANION GAP SERPL CALCULATED.3IONS-SCNC: 10 MMOL/L (ref 9–16)
AST SERPL-CCNC: 18 U/L (ref 10–35)
BILIRUB SERPL-MCNC: <0.2 MG/DL (ref 0–1.2)
BUN SERPL-MCNC: 4 MG/DL (ref 6–20)
CALCIUM SERPL-MCNC: 8.3 MG/DL (ref 8.6–10.4)
CHLORIDE SERPL-SCNC: 107 MMOL/L (ref 98–107)
CO2 SERPL-SCNC: 19 MMOL/L (ref 20–31)
CREAT SERPL-MCNC: 0.5 MG/DL (ref 0.6–0.9)
GFR, ESTIMATED: >90 ML/MIN/1.73M2
GLUCOSE SERPL-MCNC: 84 MG/DL (ref 74–99)
POTASSIUM SERPL-SCNC: 3.6 MMOL/L (ref 3.7–5.3)
PROT SERPL-MCNC: 5.2 G/DL (ref 6.6–8.7)
SODIUM SERPL-SCNC: 136 MMOL/L (ref 136–145)
SURGICAL PATHOLOGY REPORT: NORMAL

## 2025-08-07 PROCEDURE — 6370000000 HC RX 637 (ALT 250 FOR IP)

## 2025-08-07 PROCEDURE — 36415 COLL VENOUS BLD VENIPUNCTURE: CPT

## 2025-08-07 PROCEDURE — 1220000000 HC SEMI PRIVATE OB R&B

## 2025-08-07 PROCEDURE — 2500000003 HC RX 250 WO HCPCS

## 2025-08-07 PROCEDURE — 80053 COMPREHEN METABOLIC PANEL: CPT

## 2025-08-07 PROCEDURE — 6360000002 HC RX W HCPCS

## 2025-08-07 RX ORDER — CYCLOBENZAPRINE HCL 10 MG
10 TABLET ORAL 3 TIMES DAILY PRN
Status: DISCONTINUED | OUTPATIENT
Start: 2025-08-07 | End: 2025-08-08 | Stop reason: HOSPADM

## 2025-08-07 RX ORDER — GABAPENTIN 300 MG/1
300 CAPSULE ORAL 3 TIMES DAILY
Status: DISCONTINUED | OUTPATIENT
Start: 2025-08-07 | End: 2025-08-08 | Stop reason: HOSPADM

## 2025-08-07 RX ADMIN — SODIUM CHLORIDE, PRESERVATIVE FREE 5 ML: 5 INJECTION INTRAVENOUS at 08:53

## 2025-08-07 RX ADMIN — CYCLOBENZAPRINE 10 MG: 10 TABLET, FILM COATED ORAL at 17:12

## 2025-08-07 RX ADMIN — GABAPENTIN 300 MG: 300 CAPSULE ORAL at 20:34

## 2025-08-07 RX ADMIN — OXYCODONE 10 MG: 5 TABLET ORAL at 17:11

## 2025-08-07 RX ADMIN — ACETAMINOPHEN 1000 MG: 500 TABLET ORAL at 12:46

## 2025-08-07 RX ADMIN — IBUPROFEN 600 MG: 600 TABLET ORAL at 04:25

## 2025-08-07 RX ADMIN — OXYCODONE 10 MG: 5 TABLET ORAL at 12:46

## 2025-08-07 RX ADMIN — SENNOSIDES, DOCUSATE SODIUM 2 TABLET: 50; 8.6 TABLET, FILM COATED ORAL at 20:34

## 2025-08-07 RX ADMIN — Medication 1 TABLET: at 08:49

## 2025-08-07 RX ADMIN — NIFEDIPINE 60 MG: 30 TABLET, FILM COATED, EXTENDED RELEASE ORAL at 08:48

## 2025-08-07 RX ADMIN — ENOXAPARIN SODIUM 135 MG: 150 INJECTION SUBCUTANEOUS at 10:13

## 2025-08-07 RX ADMIN — SENNOSIDES, DOCUSATE SODIUM 2 TABLET: 50; 8.6 TABLET, FILM COATED ORAL at 08:48

## 2025-08-07 RX ADMIN — SODIUM CHLORIDE, PRESERVATIVE FREE 5 ML: 5 INJECTION INTRAVENOUS at 08:52

## 2025-08-07 RX ADMIN — OXYCODONE 5 MG: 5 TABLET ORAL at 08:48

## 2025-08-07 RX ADMIN — OXYCODONE 10 MG: 5 TABLET ORAL at 04:25

## 2025-08-07 ASSESSMENT — PAIN DESCRIPTION - LOCATION
LOCATION: ABDOMEN
LOCATION: OTHER (COMMENT)
LOCATION: OTHER (COMMENT)
LOCATION: ABDOMEN
LOCATION: INCISION

## 2025-08-07 ASSESSMENT — PAIN DESCRIPTION - DESCRIPTORS
DESCRIPTORS: ACHING
DESCRIPTORS: ACHING
DESCRIPTORS: SHOOTING;STABBING
DESCRIPTORS: ACHING;DISCOMFORT
DESCRIPTORS: ACHING

## 2025-08-07 ASSESSMENT — PAIN DESCRIPTION - PAIN TYPE
TYPE: SURGICAL PAIN
TYPE: SURGICAL PAIN

## 2025-08-07 ASSESSMENT — PAIN SCALES - GENERAL
PAINLEVEL_OUTOF10: 3
PAINLEVEL_OUTOF10: 9
PAINLEVEL_OUTOF10: 7
PAINLEVEL_OUTOF10: 6
PAINLEVEL_OUTOF10: 7

## 2025-08-07 ASSESSMENT — PAIN DESCRIPTION - ORIENTATION
ORIENTATION: MID
ORIENTATION: MID;LOWER

## 2025-08-07 ASSESSMENT — PAIN DESCRIPTION - RADICULAR PAIN: RADICULAR_PAIN: ABSENT

## 2025-08-08 VITALS
OXYGEN SATURATION: 95 % | SYSTOLIC BLOOD PRESSURE: 152 MMHG | RESPIRATION RATE: 16 BRPM | TEMPERATURE: 98.1 F | DIASTOLIC BLOOD PRESSURE: 78 MMHG | HEART RATE: 87 BPM

## 2025-08-08 PROCEDURE — 6370000000 HC RX 637 (ALT 250 FOR IP)

## 2025-08-08 PROCEDURE — 6360000002 HC RX W HCPCS

## 2025-08-08 RX ORDER — CYCLOBENZAPRINE HCL 10 MG
10 TABLET ORAL 3 TIMES DAILY PRN
Qty: 30 TABLET | Refills: 1 | Status: SHIPPED | OUTPATIENT
Start: 2025-08-08 | End: 2025-08-28

## 2025-08-08 RX ORDER — NIFEDIPINE 30 MG/1
90 TABLET, EXTENDED RELEASE ORAL DAILY
Status: DISCONTINUED | OUTPATIENT
Start: 2025-08-08 | End: 2025-08-08 | Stop reason: HOSPADM

## 2025-08-08 RX ORDER — NIFEDIPINE 30 MG/1
90 TABLET, EXTENDED RELEASE ORAL DAILY
Qty: 30 TABLET | Refills: 3 | Status: SHIPPED | OUTPATIENT
Start: 2025-08-09

## 2025-08-08 RX ADMIN — CYCLOBENZAPRINE 10 MG: 10 TABLET, FILM COATED ORAL at 04:45

## 2025-08-08 RX ADMIN — Medication: at 13:23

## 2025-08-08 RX ADMIN — Medication 1 TABLET: at 08:54

## 2025-08-08 RX ADMIN — ACETAMINOPHEN 1000 MG: 500 TABLET ORAL at 00:15

## 2025-08-08 RX ADMIN — IBUPROFEN 600 MG: 600 TABLET ORAL at 04:56

## 2025-08-08 RX ADMIN — OXYCODONE 10 MG: 5 TABLET ORAL at 08:54

## 2025-08-08 RX ADMIN — OXYCODONE 10 MG: 5 TABLET ORAL at 00:15

## 2025-08-08 RX ADMIN — GABAPENTIN 300 MG: 300 CAPSULE ORAL at 11:19

## 2025-08-08 RX ADMIN — CYCLOBENZAPRINE 10 MG: 10 TABLET, FILM COATED ORAL at 13:10

## 2025-08-08 RX ADMIN — ENOXAPARIN SODIUM 135 MG: 150 INJECTION SUBCUTANEOUS at 08:57

## 2025-08-08 RX ADMIN — SENNOSIDES, DOCUSATE SODIUM 2 TABLET: 50; 8.6 TABLET, FILM COATED ORAL at 08:53

## 2025-08-08 RX ADMIN — OXYCODONE 10 MG: 5 TABLET ORAL at 13:11

## 2025-08-08 RX ADMIN — NIFEDIPINE 90 MG: 30 TABLET, FILM COATED, EXTENDED RELEASE ORAL at 08:55

## 2025-08-08 RX ADMIN — ACETAMINOPHEN 1000 MG: 500 TABLET ORAL at 07:24

## 2025-08-08 RX ADMIN — ACETAMINOPHEN 1000 MG: 500 TABLET ORAL at 13:10

## 2025-08-08 RX ADMIN — OXYCODONE 10 MG: 5 TABLET ORAL at 04:57

## 2025-08-08 ASSESSMENT — PAIN DESCRIPTION - LOCATION
LOCATION: ABDOMEN;NECK
LOCATION: ABDOMEN

## 2025-08-08 ASSESSMENT — PAIN SCALES - GENERAL
PAINLEVEL_OUTOF10: 6
PAINLEVEL_OUTOF10: 7
PAINLEVEL_OUTOF10: 7
PAINLEVEL_OUTOF10: 8
PAINLEVEL_OUTOF10: 7
PAINLEVEL_OUTOF10: 3

## 2025-08-08 ASSESSMENT — PAIN DESCRIPTION - DESCRIPTORS
DESCRIPTORS: ACHING;DISCOMFORT;SORE
DESCRIPTORS: ACHING;DISCOMFORT

## 2025-08-08 ASSESSMENT — PAIN DESCRIPTION - PAIN TYPE
TYPE: SURGICAL PAIN
TYPE: SURGICAL PAIN

## 2025-08-08 ASSESSMENT — PAIN DESCRIPTION - ORIENTATION
ORIENTATION: RIGHT;MID
ORIENTATION: MID

## 2025-08-11 ENCOUNTER — POSTPARTUM VISIT (OUTPATIENT)
Dept: OBGYN CLINIC | Age: 45
End: 2025-08-11

## 2025-08-11 VITALS
HEART RATE: 100 BPM | DIASTOLIC BLOOD PRESSURE: 80 MMHG | BODY MASS INDEX: 30.18 KG/M2 | WEIGHT: 187 LBS | SYSTOLIC BLOOD PRESSURE: 130 MMHG

## 2025-08-11 PROBLEM — O12.11 PROTEINURIA AFFECTING PREGNANCY IN FIRST TRIMESTER: Status: RESOLVED | Noted: 2025-07-01 | Resolved: 2025-08-11

## 2025-08-11 PROBLEM — Z34.90 SIXTH PREGNANCY: Status: RESOLVED | Noted: 2025-02-04 | Resolved: 2025-08-11

## 2025-08-11 PROBLEM — N92.0 MENORRHAGIA: Status: RESOLVED | Noted: 2021-06-23 | Resolved: 2025-08-11

## 2025-08-11 PROBLEM — Z87.19 HX OF INTESTINAL OBSTRUCTION: Status: RESOLVED | Noted: 2025-01-23 | Resolved: 2025-08-11

## 2025-08-11 PROBLEM — O09.529 ANTEPARTUM MULTIGRAVIDA OF ADVANCED MATERNAL AGE: Status: RESOLVED | Noted: 2025-03-20 | Resolved: 2025-08-11

## 2025-08-11 PROBLEM — O09.299 HISTORY OF GESTATIONAL DIABETES IN PRIOR PREGNANCY, CURRENTLY PREGNANT: Status: RESOLVED | Noted: 2025-01-23 | Resolved: 2025-08-11

## 2025-08-11 PROBLEM — O09.93 HIGH-RISK PREGNANCY IN THIRD TRIMESTER: Status: RESOLVED | Noted: 2025-08-05 | Resolved: 2025-08-11

## 2025-08-11 PROBLEM — Z86.32 HISTORY OF GESTATIONAL DIABETES IN PRIOR PREGNANCY, CURRENTLY PREGNANT: Status: RESOLVED | Noted: 2025-01-23 | Resolved: 2025-08-11

## 2025-08-11 PROCEDURE — 99024 POSTOP FOLLOW-UP VISIT: CPT | Performed by: STUDENT IN AN ORGANIZED HEALTH CARE EDUCATION/TRAINING PROGRAM

## 2025-08-11 RX ORDER — NIFEDIPINE 90 MG/1
90 TABLET, EXTENDED RELEASE ORAL DAILY
COMMUNITY
Start: 2025-08-08

## (undated) DEVICE — SET ENDOSCP SEAL HYSTEROSCOPE RIG OUTFLO CHN DISP MYOSURE

## (undated) DEVICE — SOLUTION SCRB 4OZ 10% POVIDONE IOD ANTIMIC BTL

## (undated) DEVICE — GLOVE ORANGE PI 7   MSG9070

## (undated) DEVICE — INTENDED FOR TISSUE SEPARATION, AND OTHER PROCEDURES THAT REQUIRE A SHARP SURGICAL BLADE TO PUNCTURE OR CUT.: Brand: BARD-PARKER ® CARBON RIB-BACK BLADES

## (undated) DEVICE — SPONGE LAP W18XL18IN WHT COT 4 PLY FLD STRUNG RADPQ DISP ST

## (undated) DEVICE — PAD,NON-ADHERENT,3X8,STERILE,LF,1/PK: Brand: MEDLINE

## (undated) DEVICE — BAG WASTE MYSOSURE FLUENT

## (undated) DEVICE — Z DISCONTINUED USE 2272124 DRAPE SURG XL N INVASIVE 2 LAYR DISP

## (undated) DEVICE — NEEDLE SPNL L3.5IN DIA25GA QNCKE TYP BVL SPINOCAN

## (undated) DEVICE — GLOVE SURG SZ 75 CRM LTX FREE POLYISOPRENE POLYMER BEAD ANTI

## (undated) DEVICE — UNDERPANTS MAT L XL SEAMLESS CLR CODE WAISTBAND KNIT

## (undated) DEVICE — DRAPE,REIN 53X77,STERILE: Brand: MEDLINE

## (undated) DEVICE — GLOVE SURG SZ 6 THK91MIL LTX FREE SYN POLYISOPRENE ANTI

## (undated) DEVICE — GLOVE ORANGE PI 7 1/2   MSG9075

## (undated) DEVICE — GOWN,AURORA,NONREINFORCED,LARGE: Brand: MEDLINE

## (undated) DEVICE — GLOVE SURG SZ 65 THK91MIL LTX FREE SYN POLYISOPRENE

## (undated) DEVICE — GOWN,SIRUS,NON REINFRCD,LARGE,SET IN SL: Brand: MEDLINE

## (undated) DEVICE — PAD,SANITARY,11 IN,MAXI,W/WINGS,N-STRL: Brand: MEDLINE

## (undated) DEVICE — SVMMC GYN MIN PK

## (undated) DEVICE — DRAPE,UNDRBUT,WHT GRAD PCH,CAPPORT,20/CS: Brand: MEDLINE

## (undated) DEVICE — DEVICE TISS REM IU CANSTR VAC TB FT PEDAL DISPOSABLE MYOSURE

## (undated) DEVICE — DRAPE SURGICAL HAND PROX AURORA

## (undated) DEVICE — SOLUTION PREP POVIDONE IOD FOR SKIN MUCOUS MEM PRIOR TO

## (undated) DEVICE — FLUID MGMT SYS FLUENT KIT 6/PK

## (undated) DEVICE — GARMENT,MEDLINE,DVT,INT,CALF,MED, GEN2: Brand: MEDLINE

## (undated) DEVICE — GLOVE SURG SZ 75 L12IN FNGR THK79MIL GRN LTX FREE

## (undated) DEVICE — GLOVE SURG SZ 7 CRM LTX FREE POLYISOPRENE POLYMER BEAD ANTI